# Patient Record
Sex: MALE | Race: WHITE | HISPANIC OR LATINO | Employment: FULL TIME | URBAN - METROPOLITAN AREA
[De-identification: names, ages, dates, MRNs, and addresses within clinical notes are randomized per-mention and may not be internally consistent; named-entity substitution may affect disease eponyms.]

---

## 2017-07-03 ENCOUNTER — HOSPITAL ENCOUNTER (OUTPATIENT)
Dept: LAB | Facility: MEDICAL CENTER | Age: 42
End: 2017-07-03
Attending: NURSE PRACTITIONER
Payer: COMMERCIAL

## 2017-07-03 ENCOUNTER — OFFICE VISIT (OUTPATIENT)
Dept: MEDICAL GROUP | Facility: MEDICAL CENTER | Age: 42
End: 2017-07-03
Payer: COMMERCIAL

## 2017-07-03 VITALS
DIASTOLIC BLOOD PRESSURE: 70 MMHG | WEIGHT: 213 LBS | HEART RATE: 69 BPM | HEIGHT: 70 IN | SYSTOLIC BLOOD PRESSURE: 126 MMHG | RESPIRATION RATE: 16 BRPM | OXYGEN SATURATION: 97 % | TEMPERATURE: 97.2 F | BODY MASS INDEX: 30.49 KG/M2

## 2017-07-03 DIAGNOSIS — M54.50 ACUTE BILATERAL LOW BACK PAIN WITHOUT SCIATICA: ICD-10-CM

## 2017-07-03 DIAGNOSIS — E66.9 OBESITY (BMI 30-39.9): ICD-10-CM

## 2017-07-03 DIAGNOSIS — R00.2 PALPITATIONS: ICD-10-CM

## 2017-07-03 LAB
ALBUMIN SERPL BCP-MCNC: 3.9 G/DL (ref 3.2–4.9)
ALBUMIN/GLOB SERPL: 1.4 G/DL
ALP SERPL-CCNC: 61 U/L (ref 30–99)
ALT SERPL-CCNC: 30 U/L (ref 2–50)
ANION GAP SERPL CALC-SCNC: 5 MMOL/L (ref 0–11.9)
AST SERPL-CCNC: 21 U/L (ref 12–45)
BILIRUB SERPL-MCNC: 0.6 MG/DL (ref 0.1–1.5)
BUN SERPL-MCNC: 14 MG/DL (ref 8–22)
CALCIUM SERPL-MCNC: 9.1 MG/DL (ref 8.5–10.5)
CHLORIDE SERPL-SCNC: 107 MMOL/L (ref 96–112)
CO2 SERPL-SCNC: 28 MMOL/L (ref 20–33)
CREAT SERPL-MCNC: 0.87 MG/DL (ref 0.5–1.4)
GFR SERPL CREATININE-BSD FRML MDRD: >60 ML/MIN/1.73 M 2
GLOBULIN SER CALC-MCNC: 2.8 G/DL (ref 1.9–3.5)
GLUCOSE SERPL-MCNC: 112 MG/DL (ref 65–99)
POTASSIUM SERPL-SCNC: 4.6 MMOL/L (ref 3.6–5.5)
PROT SERPL-MCNC: 6.7 G/DL (ref 6–8.2)
SODIUM SERPL-SCNC: 140 MMOL/L (ref 135–145)
TSH SERPL DL<=0.005 MIU/L-ACNC: 1.77 UIU/ML (ref 0.3–3.7)

## 2017-07-03 PROCEDURE — 93000 ELECTROCARDIOGRAM COMPLETE: CPT | Performed by: NURSE PRACTITIONER

## 2017-07-03 PROCEDURE — 80053 COMPREHEN METABOLIC PANEL: CPT

## 2017-07-03 PROCEDURE — 36415 COLL VENOUS BLD VENIPUNCTURE: CPT

## 2017-07-03 PROCEDURE — 99203 OFFICE O/P NEW LOW 30 MIN: CPT | Performed by: NURSE PRACTITIONER

## 2017-07-03 PROCEDURE — 84443 ASSAY THYROID STIM HORMONE: CPT

## 2017-07-03 ASSESSMENT — PATIENT HEALTH QUESTIONNAIRE - PHQ9: CLINICAL INTERPRETATION OF PHQ2 SCORE: 0

## 2017-07-03 ASSESSMENT — ENCOUNTER SYMPTOMS
PALPITATIONS: 1
BACK PAIN: 1

## 2017-07-03 NOTE — PROGRESS NOTES
"Subjective:      Nilo Grant is a 41 y.o. male who presents with Establish Care and Palpitations            Palpitations     Nilo Grant is here today to establish care and for palpitation concerns and back pain. Patient recently moved here from [place].        1. Palpitations  Patient states that for years has had some problems with palpitations and states he had some sort of workup in [place] but it was negative. Over the past 2-3 weeks he has noticed it more prominently. He describes it as a \"very strong\" type feeling that may last for just a few seconds to a minute. He thinks he is short of breath when it occurs also. It seems to happen more when he is angry but he states it happened the other day when he was just walking short distances well. He reports there is no associated dizziness, syncope, nausea or radiating pain. He denies regular alcohol usage or stimulant usage. He states a pack of cigarettes will last him 2 weeks.    2. Acute bilateral low back pain without sciatica  Patient also reports he has been having some pain in his lower back bilaterally for about a week. There was no history of trauma. The pain is mostly with turning or bending. He denies weakness or numbness of the extremities and denies bowel or bladder problems. Symptoms improved with walking. He denies history of back problems.        Social History   Substance Use Topics   • Smoking status: Current Every Day Smoker     Types: Cigarettes   • Smokeless tobacco: Never Used      Comment: 1 pack per 2 weeks   • Alcohol Use: Yes      Comment: occas     No current outpatient prescriptions on file.     No current facility-administered medications for this visit.     Family History   Problem Relation Age of Onset   • Breast Cancer Mother    • Diabetes Father    • Hyperlipidemia Father    • Heart Attack Father    History reviewed. No pertinent past medical history.    Review of Systems   Cardiovascular: Positive for palpitations. " "  Musculoskeletal: Positive for back pain.   All other systems reviewed and are negative.         Objective:     /70 mmHg  Pulse 69  Temp(Src) 36.2 °C (97.2 °F)  Resp 16  Ht 1.778 m (5' 10\")  Wt 96.616 kg (213 lb)  BMI 30.56 kg/m2  SpO2 97%     Physical Exam   Constitutional: He is oriented to person, place, and time. He appears well-developed and well-nourished. No distress.   HENT:   Head: Normocephalic and atraumatic.   Right Ear: External ear normal.   Left Ear: External ear normal.   Nose: Nose normal.   Mouth/Throat: Oropharynx is clear and moist.   Eyes: Conjunctivae are normal. Right eye exhibits no discharge. Left eye exhibits no discharge.   Neck: Normal range of motion. Neck supple. No tracheal deviation present. No thyromegaly present.   Cardiovascular: Normal rate, regular rhythm and normal heart sounds.    No murmur heard.  Pulmonary/Chest: Effort normal and breath sounds normal. No respiratory distress. He has no wheezes. He has no rales.   Musculoskeletal:        Lumbar back: He exhibits pain.   Lymphadenopathy:     He has no cervical adenopathy.   Neurological: He is alert and oriented to person, place, and time. Coordination normal.   5/5 strength of lower extremities bilaterally to flexion and extension.   Skin: Skin is warm and dry. No rash noted. He is not diaphoretic. No erythema.   Psychiatric: He has a normal mood and affect. His behavior is normal. Judgment and thought content normal.   Nursing note and vitals reviewed.              Assessment/Plan:     1. Palpitations  EKG in the office today shows normal sinus rhythm and there is no obvious cause for his symptoms except for possibly anxiety or anger. I will refer him to cardiology to see if a Holter monitor is warranted. I will check thyroid.  - COMP METABOLIC PANEL; Future  - TSH; Future  - [place] ELECTROCARDIOGRAM, COMPLETE  - REFERRAL TO CARDIOLOGY    2. Acute bilateral low back pain without sciatica  Appears to be " musculoskeletal and has only been present for one week. I recommended weight loss, increased exercise, and he or ice to the area. He may use Tylenol for pain. If it does not improve in 4 weeks I recommended physical therapy and imaging.    3. Obesity (BMI 30-39.9)    - Patient identified as having weight management issue.  Appropriate orders and counseling given.

## 2017-07-03 NOTE — MR AVS SNAPSHOT
"Nilo Grant   7/3/2017 8:00 AM   Office Visit   MRN: [id number]    Department:  [address] [place] [address]   Dept Phone:  [phone]    Description:  Male : 1975   Provider:  RANDY Perez           Reason for Visit     Establish Care general check up    Palpitations for the past 15 days/ come and goes       Allergies as of 7/3/2017     Not on File      You were diagnosed with     Palpitations   [785.1.ICD-9-CM]       Acute bilateral low back pain without sciatica   [3665069]       Obesity (BMI 30-39.9)   [019891]         Vital Signs     Blood Pressure Pulse Temperature Respirations Height Weight    126/70 mmHg 69 36.2 °C (97.2 °F) 16 1.778 m (5' 10\") 96.616 kg (213 lb)    Body Mass Index Oxygen Saturation Smoking Status             30.56 kg/m2 97% Current Every Day Smoker         Basic Information     Date Of Birth Sex Race Ethnicity Preferred Language    1975 Male White  Origin ([place]er,[place] Islander,S[place]an, etc) English      Problem List              ICD-10-CM Priority Class Noted - Resolved    Palpitations R00.2   7/3/2017 - Present    Obesity (BMI 30-39.9) E66.9   7/3/2017 - Present      Health Maintenance     Patient has no pending health maintenance at this time      Current Immunizations     No immunizations on file.      Below and/or attached are the medications your provider expects you to take. Review all of your home medications and newly ordered medications with your provider and/or pharmacist. Follow medication instructions as directed by your provider and/or pharmacist. Please keep your medication list with you and share with your provider. Update the information when medications are discontinued, doses are changed, or new medications (including over-the-counter products) are added; and carry medication information at all times in the event of emergency situations     Allergies:  No Known Allergies          Medications  Valid as of: 2017 -  8:16 AM   " Generic Name Brand Name Tablet Size Instructions for use    .                 Medicines prescribed today were sent to:     [facility] [address] [facility] ([place] [place] [facility] [address]    [address] [facility] [place]) [place] [address]    Phone: [phone] Fax: [phone]    Open 24 Hours?: No      Medication refill instructions:       If your prescription bottle indicates you have medication refills left, it is not necessary to call your provider’s office. Please contact your pharmacy and they will refill your medication.    If your prescription bottle indicates you do not have any refills left, you may request refills at any time through one of the following ways: The online [employer] system (except Urgent Care), by calling your provider’s office, or by asking your pharmacy to contact your provider’s office with a refill request. Medication refills are processed only during regular business hours and may not be available until the next business day. Your provider may request additional information or to have a follow-up visit with you prior to refilling your medication.   *Please Note: Medication refills are assigned a new Rx number when refilled electronically. Your pharmacy may indicate that no refills were authorized even though a new prescription for the same medication is available at the pharmacy. Please request the medicine by name with the pharmacy before contacting your provider for a refill.        Your To Do List     Future Labs/Procedures Complete By Expires    COMP METABOLIC PANEL  As directed 7/4/2018    TSH  As directed 7/4/2018      Referral     A referral request has been sent to our patient care coordination department. Please allow 3-5 business days for us to process this request and contact you either by phone or mail. If you do not hear from us by the 5th business day, please call us at [phone].           [employer] Status: Patient Declined        Quit Tobacco Information     Do you want to quit using  tobacco?    Quitting tobacco decreases risks of cancer, heart and lung disease, increases life expectancy, improves sense of taste and smell, and increases spending money, among other benefits.    If you are thinking about quitting, we can help.  • [facility] Quit Tobacco Program: 806.745.8950  o Program occurs weekly for four weeks and includes pharmacist consultation on products to support quitting smoking or chewing tobacco. A provider referral is needed for pharmacist consultation.  • Tobacco Users Help Hotline: 2-626-QUIT-NOW (055-8408) or https://[place].quitlogix.org/  o Free, confidential telephone and online coaching for [place] residents. Sessions are designed on a schedule that is convenient for you. Eligible clients receive free nicotine replacement therapy.  • Nationally: www.smokefree.gov  o Information and professional assistance to support both immediate and long-term needs as you become, and remain, a non-smoker. Smokefree.gov allows you to choose the help that best fits your needs.

## 2017-07-03 NOTE — Clinical Note
[facility]  RANDY Perez  [address]  [place] [address]  Fax: [phone]   Authorization for Release/Disclosure of   Protected Health Information   Name: NILO GRANT : 1975 SSN: [id number]   Address: [address] [place] [address] Phone:    [phone] (home)    I authorize the entity listed below to release/disclose the PHI below to:   [facility]/RANDY Perez and RANDY Perez   Provider or Entity Name:     Address   City, State, Zip   Phone:      Fax:     Reason for request: continuity of care   Information to be released:    [  ] LAST COLONOSCOPY,  including any PATH REPORT and follow-up  [  ] LAST FIT/COLOGUARD RESULT [  ] LAST DEXA  [  ] LAST MAMMOGRAM  [  ] LAST PAP  [  ] LAST LABS [  ] RETINA EXAM REPORT  [  ] IMMUNIZATION RECORDS  [  ] Release all info      [  ] Check here and initial the line next to each item to release ALL health information INCLUDING  _____ Care and treatment for drug and / or alcohol abuse  _____ HIV testing, infection status, or AIDS  _____ Genetic Testing    DATES OF SERVICE OR TIME PERIOD TO BE DISCLOSED: _____________  I understand and acknowledge that:  * This Authorization may be revoked at any time by you in writing, except if your health information has already been used or disclosed.  * Your health information that will be used or disclosed as a result of you signing this authorization could be re-disclosed by the recipient. If this occurs, your re-disclosed health information may no longer be protected by State or Federal laws.  * You may refuse to sign this Authorization. Your refusal will not affect your ability to obtain treatment.  * This Authorization becomes effective upon signing and will  on (date) __________.      If no date is indicated, this Authorization will  one (1) year from the signature date.    Name: Nilo Grant    Signature:   Date:     7/3/2017       PLEASE FAX  REQUESTED RECORDS BACK TO: [phone]

## 2017-08-04 ENCOUNTER — OFFICE VISIT (OUTPATIENT)
Dept: CARDIOLOGY | Facility: MEDICAL CENTER | Age: 42
End: 2017-08-04
Payer: COMMERCIAL

## 2017-08-04 VITALS
WEIGHT: 212 LBS | HEIGHT: 70 IN | SYSTOLIC BLOOD PRESSURE: 130 MMHG | OXYGEN SATURATION: 96 % | BODY MASS INDEX: 30.35 KG/M2 | HEART RATE: 62 BPM | DIASTOLIC BLOOD PRESSURE: 88 MMHG

## 2017-08-04 DIAGNOSIS — Z13.6 SCREENING FOR ISCHEMIC HEART DISEASE: ICD-10-CM

## 2017-08-04 DIAGNOSIS — R00.2 PALPITATIONS: ICD-10-CM

## 2017-08-04 DIAGNOSIS — R42 DIZZINESS: ICD-10-CM

## 2017-08-04 DIAGNOSIS — E66.9 OBESITY (BMI 30-39.9): ICD-10-CM

## 2017-08-04 DIAGNOSIS — R73.9 HYPERGLYCEMIA: ICD-10-CM

## 2017-08-04 DIAGNOSIS — R40.0 DAYTIME SLEEPINESS: ICD-10-CM

## 2017-08-04 PROCEDURE — 99204 OFFICE O/P NEW MOD 45 MIN: CPT | Performed by: INTERNAL MEDICINE

## 2017-08-04 PROCEDURE — 93000 ELECTROCARDIOGRAM COMPLETE: CPT | Performed by: INTERNAL MEDICINE

## 2017-08-04 NOTE — Clinical Note
[facility]   [address]   [address]  [place] [address]  Phone: [phone]  Fax: [phone]              Nilo Arciniega  1975    Encounter Date: 8/4/2017    RANDY Perez    Thank you for the referral. I had the pleasure of seeing Nilo Arciniega today in cardiology clinic. I've attached my visit note below. If you have any questions please feel free to give me a call anytime.      Sarkis Saini MD, PhD, [facility]  Cardiology and Lipidology  [facility]                                                                  PROGRESS NOTE:  Cardiology Consult Note:    Sarkis Saini  Date & Time note created:    8/4/2017   8:04 AM       Patient ID:  Name:             Nilo Liu   YOB: 1975  Age:                 42 y.o.  male   MRN:               [id number]                                                             Chief Complaint:   Chief Complaint   Patient presents with   • Palpitations   • Dizziness   • Numbness             History of Present Illness:   Nilo Arciniega has recent increased palpitation with dizziness x 2 wks; Started palpitation 23 yrs ago when his Mother past; but did not have dizziness; Recent daytime sleepiness and fatigue; Increased social/family stress; Occ EtOH and daily Coffee; More recent episodes of wake up gapping air at sleep    Review of Systems:  See above   Constitutional: Denies fevers, Denies weight changes  Eyes: Denies changes in vision, no eye pain  Ears/Nose/Throat/Mouth: Denies nasal congestion or sore throat   Cardiovascular: Denies chest pain palpitations   Respiratory: Denies shortness of breath , Denies cough  Gastrointestinal/Hepatic: Denies abdominal pain, nausea, vomiting, diarrhea, constipation or GI bleeding   Genitourinary: Denies bladder dysfunction, dysuria or  "frequency  Musculoskeletal/Rheum: Denies  joint pain and swelling   Skin/Breast: Denies rash, denies breast lumps or discharge  Neurological: Denies headache, confusion, memory loss or focal weakness/parasthesias  Psychiatric: denies mood disorder   Endocrine: denies hx of diabetes or thyroid dysfunction  Heme/Oncology/Lymph Nodes: Denies enlarged lymph nodes, denies bruising or known bleeding disorder  Allergic/Immunologic: Denies hx of allergies      All other systems were reviewed and are negative (AMA/CMS criteria)    Past Medical History:   No past medical history on file.      Past Surgical History:  Past Surgical History   Procedure Laterality Date   • Appendectomy         Hospital Medications:  No current outpatient prescriptions on file.    Current Outpatient Medications:  No current outpatient prescriptions on file.     No current facility-administered medications for this visit.         Medication Allergy/Sensitivities:  No Known Allergies    Family History:    Family History   Problem Relation Age of Onset   • Breast Cancer Mother    • Diabetes Father    • Hyperlipidemia Father    • Heart Attack Father        Social History:  Social History     Social History   • Marital Status:      Spouse Name: N/A   • Number of Children: N/A   • Years of Education: N/A     Occupational History   • Not on file.     Social History Main Topics   • Smoking status: Current Every Day Smoker     Types: Cigarettes   • Smokeless tobacco: Never Used      Comment: 1 pack per 2 weeks   • Alcohol Use: Yes      Comment: occas   • Drug Use: No   • Sexual Activity:     Partners: Female     Other Topics Concern   • Not on file     Social History Narrative   • No narrative on file         Physical Exam:  Vitals  Weight/BMI: Body mass index is 30.42 kg/(m^2).  Blood pressure 130/88, pulse 62, height 1.778 m (5' 10\"), weight 96.163 kg (212 lb), SpO2 96 %.  Filed Vitals:    08/04/17 0743   BP: 130/88   Pulse: 62   Height: 1.778 m " "(5' 10\")   Weight: 96.163 kg (212 lb)   SpO2: 96%     Oxygen Therapy:  Pulse Oximetry: 96 %  General Appearance:   Well developed, Well nourished, No acute distress, Non-toxic appearance.   HENT:  Normocephalic, Atraumatic, Oropharynx moist mucous membranes, Dentition: Mallampati 4 OP, Nose normal.    Eyes:  PERRLA, EOMI, Conjunctiva normal, No discharge.  Neck:  Normal range of motion, No cervical tenderness, Supple, No stridor, no JVD .  No thyromegaly.  No carotid bruit.  Cardiovascular:  Normal heart rate, Normal rhythm,  S1, S2, no S3,  S4; No gallops; No murmurs, No rubs, .   Extremitites with intact distal pulses, no cyanosis, clubbing or edema.  No heaves, thrills, HJR;  Peripheral pulses: carotid 2+, brachial 2+, radial 2+, ulnar 2+, femoral 2+, popliteal 2+, PT 2+, DP 2+;  Lungs:  Respiratory effort is normal. Normal breath sounds, breath sounds clear to auscultation bilaterally,  no rales, no rhonchi, no wheezing.   Abdomen: Bowel sounds normal, Soft, No tenderness, No guarding, No rebound, No masses, No hepatosplenomegaly.  Skin: Warm, Dry, No erythema, No rash, no induration or crepitus.  Neurologic: Alert & oriented x 3, Normal motor function, Normal sensory function, No focal deficits noted, cranial nerves II through XII are normal,  normal gait.  Psychiatric: Affect normal, Judgment normal, Mood normal.      Data Review:     Records reviewed and summarized in current documentation    Lab Data Review:  Lab Results   Component Value Date/Time    SODIUM 140 07/03/2017 08:50 AM    POTASSIUM 4.6 07/03/2017 08:50 AM    CHLORIDE 107 07/03/2017 08:50 AM    CO2 28 07/03/2017 08:50 AM    GLUCOSE 112* 07/03/2017 08:50 AM    BUN 14 07/03/2017 08:50 AM    CREATININE 0.87 07/03/2017 08:50 AM      No results found for: PROTHROMBTM, INR   No results found for: WBC, RBC, HEMOGLOBIN, HEMATOCRIT, MCV, MCH, MCHC, MPV, NEUTSPOLYS, LYMPHOCYTES, MONOCYTES, EOSINOPHILS, BASOPHILS, HYPOCHROMIA, ANISOCYTOSIS "     Imaging/Procedures Review:    To my review shows:na    EKG To my review shows: SR     Assessment and Plan.   42 y.o. male has symptomatic palpitation; Advise off Caffeine and EtOH for now; Pls see orders for eval; Discussed with patient the OPO ordered, may order O2 supplement and/or  sleep evaluation if OPO findings are abnormal; the patient will be contacted in the future regarding my advisement.      1. Dizziness    - ECHOCARDIOGRAM COMP W/O CONT; Future    2. Palpitations    - ECHOCARDIOGRAM COMP W/O CONT; Future  - HOLTER MONITOR STUDY; Future  - CBC WITHOUT DIFFERENTIAL  - COMP METABOLIC PANEL; Future  - TSH; Future    3. Screening for ischemic heart disease    - LIPID PANEL  - CBC WITHOUT DIFFERENTIAL  - COMP METABOLIC PANEL; Future    4. Hyperglycemia    - HEMOGLOBIN A1C    5. Obesity (BMI 30-39.9)  Up and down      Return to clinic in  2 , months  1. Dizziness  ECHOCARDIOGRAM COMP W/O CONT   2. Palpitations  ECHOCARDIOGRAM COMP W/O CONT    HOLTER MONITOR STUDY    CBC WITHOUT DIFFERENTIAL    COMP METABOLIC PANEL    TSH   3. Screening for ischemic heart disease  LIPID PANEL    CBC WITHOUT DIFFERENTIAL    COMP METABOLIC PANEL   4. Hyperglycemia  HEMOGLOBIN A1C   5. Obesity (BMI 30-39.9)           Jorge Lugo, A.P.N.  [address]  [address]  [place] [address]  VIA In Basket

## 2017-08-04 NOTE — PROGRESS NOTES
Cardiology Consult Note:    Sarkis Saini  Date & Time note created:    8/4/2017   8:04 AM       Patient ID:  Name:             Nilo Liu   YOB: 1975  Age:                 42 y.o.  male   MRN:               [id number]                                                             Chief Complaint:   Chief Complaint   Patient presents with   • Palpitations   • Dizziness   • Numbness             History of Present Illness:   Nilo Arciniega has recent increased palpitation with dizziness x 2 wks; Started palpitation 23 yrs ago when his Mother past; but did not have dizziness; Recent daytime sleepiness and fatigue; Increased social/family stress; Occ EtOH and daily Coffee; More recent episodes of wake up gapping air at sleep    Review of Systems:  See above   Constitutional: Denies fevers, Denies weight changes  Eyes: Denies changes in vision, no eye pain  Ears/Nose/Throat/Mouth: Denies nasal congestion or sore throat   Cardiovascular: Denies chest pain palpitations   Respiratory: Denies shortness of breath , Denies cough  Gastrointestinal/Hepatic: Denies abdominal pain, nausea, vomiting, diarrhea, constipation or GI bleeding   Genitourinary: Denies bladder dysfunction, dysuria or frequency  Musculoskeletal/Rheum: Denies  joint pain and swelling   Skin/Breast: Denies rash, denies breast lumps or discharge  Neurological: Denies headache, confusion, memory loss or focal weakness/parasthesias  Psychiatric: denies mood disorder   Endocrine: denies hx of diabetes or thyroid dysfunction  Heme/Oncology/Lymph Nodes: Denies enlarged lymph nodes, denies bruising or known bleeding disorder  Allergic/Immunologic: Denies hx of allergies      All other systems were reviewed and are negative (AMA/CMS criteria)    Past Medical History:   No past medical history on file.      Past Surgical History:  Past Surgical History   Procedure Laterality Date   • Appendectomy         Hospital  "Medications:  No current outpatient prescriptions on file.    Current Outpatient Medications:  No current outpatient prescriptions on file.     No current facility-administered medications for this visit.         Medication Allergy/Sensitivities:  No Known Allergies    Family History:    Family History   Problem Relation Age of Onset   • Breast Cancer Mother    • Diabetes Father    • Hyperlipidemia Father    • Heart Attack Father        Social History:  Social History     Social History   • Marital Status:      Spouse Name: N/A   • Number of Children: N/A   • Years of Education: N/A     Occupational History   • Not on file.     Social History Main Topics   • Smoking status: Current Every Day Smoker     Types: Cigarettes   • Smokeless tobacco: Never Used      Comment: 1 pack per 2 weeks   • Alcohol Use: Yes      Comment: occas   • Drug Use: No   • Sexual Activity:     Partners: Female     Other Topics Concern   • Not on file     Social History Narrative   • No narrative on file         Physical Exam:  Vitals  Weight/BMI: Body mass index is 30.42 kg/(m^2).  Blood pressure 130/88, pulse 62, height 1.778 m (5' 10\"), weight 96.163 kg (212 lb), SpO2 96 %.  Filed Vitals:    08/04/17 0743   BP: 130/88   Pulse: 62   Height: 1.778 m (5' 10\")   Weight: 96.163 kg (212 lb)   SpO2: 96%     Oxygen Therapy:  Pulse Oximetry: 96 %  General Appearance:   Well developed, Well nourished, No acute distress, Non-toxic appearance.   HENT:  Normocephalic, Atraumatic, Oropharynx moist mucous membranes, Dentition: Mallampati 4 OP, Nose normal.    Eyes:  PERRLA, EOMI, Conjunctiva normal, No discharge.  Neck:  Normal range of motion, No cervical tenderness, Supple, No stridor, no JVD .  No thyromegaly.  No carotid bruit.  Cardiovascular:  Normal heart rate, Normal rhythm,  S1, S2, no S3,  S4; No gallops; No murmurs, No rubs, .   Extremitites with intact distal pulses, no cyanosis, clubbing or edema.  No heaves, thrills, HJR;  Peripheral " pulses: carotid 2+, brachial 2+, radial 2+, ulnar 2+, femoral 2+, popliteal 2+, PT 2+, DP 2+;  Lungs:  Respiratory effort is normal. Normal breath sounds, breath sounds clear to auscultation bilaterally,  no rales, no rhonchi, no wheezing.   Abdomen: Bowel sounds normal, Soft, No tenderness, No guarding, No rebound, No masses, No hepatosplenomegaly.  Skin: Warm, Dry, No erythema, No rash, no induration or crepitus.  Neurologic: Alert & oriented x 3, Normal motor function, Normal sensory function, No focal deficits noted, cranial nerves II through XII are normal,  normal gait.  Psychiatric: Affect normal, Judgment normal, Mood normal.      Data Review:     Records reviewed and summarized in current documentation    Lab Data Review:  Lab Results   Component Value Date/Time    SODIUM 140 07/03/2017 08:50 AM    POTASSIUM 4.6 07/03/2017 08:50 AM    CHLORIDE 107 07/03/2017 08:50 AM    CO2 28 07/03/2017 08:50 AM    GLUCOSE 112* 07/03/2017 08:50 AM    BUN 14 07/03/2017 08:50 AM    CREATININE 0.87 07/03/2017 08:50 AM      No results found for: PROTHROMBTM, INR   No results found for: WBC, RBC, HEMOGLOBIN, HEMATOCRIT, MCV, MCH, MCHC, MPV, NEUTSPOLYS, LYMPHOCYTES, MONOCYTES, EOSINOPHILS, BASOPHILS, HYPOCHROMIA, ANISOCYTOSIS     Imaging/Procedures Review:    To my review shows:na    EKG To my review shows: SR     Assessment and Plan.   42 y.o. male has symptomatic palpitation; Advise off Caffeine and EtOH for now; Pls see orders for eval; Discussed with patient the OPO ordered, may order O2 supplement and/or  sleep evaluation if OPO findings are abnormal; the patient will be contacted in the future regarding my advisement.      1. Dizziness    - ECHOCARDIOGRAM COMP W/O CONT; Future    2. Palpitations    - ECHOCARDIOGRAM COMP W/O CONT; Future  - HOLTER MONITOR STUDY; Future  - CBC WITHOUT DIFFERENTIAL  - COMP METABOLIC PANEL; Future  - TSH; Future    3. Screening for ischemic heart disease    - LIPID PANEL  - CBC WITHOUT  DIFFERENTIAL  - COMP METABOLIC PANEL; Future    4. Hyperglycemia    - HEMOGLOBIN A1C    5. Obesity (BMI 30-39.9)  Up and down      Return to clinic in  2 , months  1. Dizziness  ECHOCARDIOGRAM COMP W/O CONT   2. Palpitations  ECHOCARDIOGRAM COMP W/O CONT    HOLTER MONITOR STUDY    CBC WITHOUT DIFFERENTIAL    COMP METABOLIC PANEL    TSH   3. Screening for ischemic heart disease  LIPID PANEL    CBC WITHOUT DIFFERENTIAL    COMP METABOLIC PANEL   4. Hyperglycemia  HEMOGLOBIN A1C   5. Obesity (BMI 30-39.9)

## 2017-08-04 NOTE — MR AVS SNAPSHOT
"        Nilo Arciniega   2017 7:45 AM   Office Visit   MRN: [id number]    Department:  Heart [facility]   Dept Phone:  [phone]    Description:  Male : 1975   Provider:  Sarkis Saini MD,[facility]           Reason for Visit     Palpitations     Dizziness     Numbness           Allergies as of 2017     No Known Allergies      You were diagnosed with     Dizziness   [836204]       Palpitations   [785.1.ICD-9-CM]       Screening for ischemic heart disease   [V81.0.ICD-9-CM]       Hyperglycemia   [763358]       Obesity (BMI 30-39.9)   [590479]       Daytime sleepiness   [3963136]         Vital Signs     Blood Pressure Pulse Height Weight Body Mass Index Oxygen Saturation    130/88 mmHg 62 1.778 m (5' 10\") 96.163 kg (212 lb) 30.42 kg/m2 96%    Smoking Status                   Current Every Day Smoker           Basic Information     Date Of Birth Sex Race Ethnicity Preferred Language    1975 Male White  Origin (Serbian,Zimbabwean,Faroese,Logan, etc) Serbian      Your appointments     Aug 08, 2017  9:45 AM   ECHO with [facility]   ECHOCARDIOLOGY [facility])    [address]  [place] [address]   [phone]            Oct 04, 2017  7:45 AM   FOLLOW UP with Sarkis Saini MD,[facility]    [address]  [address]  [place] [id number] [phone]              Problem List              ICD-10-CM Priority Class Noted - Resolved    Palpitations R00.2   7/3/2017 - Present    Obesity (BMI 30-39.9) E66.9   7/3/2017 - Present      Health Maintenance        Date Due Completion Dates    IMM DTaP/Tdap/Td Vaccine (1 - Tdap) 1996 ---    IMM INFLUENZA (1) 2017 ---            Current Immunizations     No immunizations on file.      Below and/or attached are the medications your provider expects you to take. Review all of your home medications and newly ordered medications with your provider " and/or pharmacist. Follow medication instructions as directed by your provider and/or pharmacist. Please keep your medication list with you and share with your provider. Update the information when medications are discontinued, doses are changed, or new medications (including over-the-counter products) are added; and carry medication information at all times in the event of emergency situations     Allergies:  No Known Allergies          Medications  Valid as of: August 04, 2017 -  8:17 AM    Generic Name Brand Name Tablet Size Instructions for use    .                 Medicines prescribed today were sent to:     [address] (), [place] [address]    [address] () [place] [address]    Phone: [phone] Fax: [phone]    Open 24 Hours?: No      Medication refill instructions:       If your prescription bottle indicates you have medication refills left, it is not necessary to call your provider’s office. Please contact your pharmacy and they will refill your medication.    If your prescription bottle indicates you do not have any refills left, you may request refills at any time through one of the following ways: The online [facility] system (except Urgent Care), by calling your provider’s office, or by asking your pharmacy to contact your provider’s office with a refill request. Medication refills are processed only during regular business hours and may not be available until the next business day. Your provider may request additional information or to have a follow-up visit with you prior to refilling your medication.   *Please Note: Medication refills are assigned a new Rx number when refilled electronically. Your pharmacy may indicate that no refills were authorized even though a new prescription for the same medication is available at the pharmacy. Please request the medicine by name with the pharmacy before contacting your provider for a refill.        Your To Do List      Future Labs/Procedures Complete By Expires    COMP METABOLIC PANEL  As directed 8/4/2018    ECHOCARDIOGRAM COMP W/O CONT  As directed 8/4/2018    HOLTER MONITOR STUDY  As directed 8/4/2018    TSH  As directed 8/4/2018         VeriTainer Access Code: [email or web address]  Expires: 8/5/2017  9:08 AM    DIYt  A secure, online tool to manage your health information     [employer] VeriTainer® is a secure, online tool that connects you to your personalized health information from the privacy of your home -- day or night - making it very easy for you to manage your healthcare. Once the activation process is completed, you can even access your medical information using the VeriTainer ariel, which is available for free in the Apple Ariel store or Google Play store.     VeriTainer provides the following levels of access (as shown below):   My Chart Features   [facility]  Specialists [facility]  Urgent  Care Non-[facility]  Primary Care  Doctor   Email your healthcare team securely and privately 24/7 X X X    Manage appointments: schedule your next appointment; view details of past/upcoming appointments X      Request prescription refills. X      View recent personal medical records, including lab and immunizations X X X X   View health record, including health history, allergies, medications X X X X   Read reports about your outpatient visits, procedures, consult and ER notes X X X X   See your discharge summary, which is a recap of your hospital and/or ER visit that includes your diagnosis, lab results, and care plan. X X       How to register for VeriTainer:  1. Go to  [email or web address].  2. Click on the Sign Up Now box, which takes you to the New Member Sign Up page. You will need to provide the following information:  a. Enter your VeriTainer Access Code exactly as it appears at the top of this page. (You will not need to use this code after you’ve completed the sign-up process. If you do not sign up before the  expiration date, you must request a new code.)   b. Enter your date of birth.   c. Enter your home email address.   d. Click Submit, and follow the next screen’s instructions.  3. Create a [employer] ID. This will be your [employer] login ID and cannot be changed, so think of one that is secure and easy to remember.  4. Create a [facility] [employer] password. You can change your password at any time.  5. Enter your Password Reset Question and Answer. This can be used at a later time if you forget your password.   6. Enter your e-mail address. This allows you to receive e-mail notifications when new information is available in [employer].  7. Click Sign Up. You can now view your health information.    For assistance activating your [employer] account, call [phone]        Quit Tobacco Information     Do you want to quit using tobacco?    Quitting tobacco decreases risks of cancer, heart and lung disease, increases life expectancy, improves sense of taste and smell, and increases spending money, among other benefits.    If you are thinking about quitting, we can help.  • [facility] Quit Tobacco Program: [phone]  o Program occurs weekly for four weeks and includes pharmacist consultation on products to support quitting smoking or chewing tobacco. A provider referral is needed for pharmacist consultation.  • Tobacco Users Help Hotline: 3-252-QUIT-NOW (243-7941) or https://[place].quitlogix.org/  o Free, confidential telephone and online coaching for [place] residents. Sessions are designed on a schedule that is convenient for you. Eligible clients receive free nicotine replacement therapy.  • Nationally: www.smokefree.gov  o Information and professional assistance to support both immediate and long-term needs as you become, and remain, a non-smoker. Smokefree.gov allows you to choose the help that best fits your needs.

## 2017-08-08 ENCOUNTER — HOSPITAL ENCOUNTER (OUTPATIENT)
Dept: CARDIOLOGY | Facility: MEDICAL CENTER | Age: 42
End: 2017-08-08
Attending: INTERNAL MEDICINE
Payer: COMMERCIAL

## 2017-08-08 DIAGNOSIS — R00.2 PALPITATIONS: ICD-10-CM

## 2017-08-08 DIAGNOSIS — R42 DIZZINESS: ICD-10-CM

## 2017-08-08 LAB
LV EJECT FRACT  99904: 55
LV EJECT FRACT MOD 2C 99903: 70.02
LV EJECT FRACT MOD 4C 99902: 49.9
LV EJECT FRACT MOD BP 99901: 61.57

## 2017-08-08 PROCEDURE — 93306 TTE W/DOPPLER COMPLETE: CPT

## 2017-08-08 PROCEDURE — 93306 TTE W/DOPPLER COMPLETE: CPT | Mod: 26 | Performed by: INTERNAL MEDICINE

## 2017-08-10 ENCOUNTER — TELEPHONE (OUTPATIENT)
Dept: CARDIOLOGY | Facility: MEDICAL CENTER | Age: 42
End: 2017-08-10

## 2017-08-23 ENCOUNTER — NON-PROVIDER VISIT (OUTPATIENT)
Dept: CARDIOLOGY | Facility: MEDICAL CENTER | Age: 42
End: 2017-08-23
Payer: COMMERCIAL

## 2017-08-23 DIAGNOSIS — R00.1 SINUS BRADYCARDIA: ICD-10-CM

## 2017-08-23 DIAGNOSIS — R00.0 SINUS TACHYCARDIA: ICD-10-CM

## 2017-08-23 DIAGNOSIS — R00.2 PALPITATIONS: ICD-10-CM

## 2017-08-29 DIAGNOSIS — R00.2 PALPITATIONS: ICD-10-CM

## 2017-08-29 LAB — EKG IMPRESSION: NORMAL

## 2017-08-29 PROCEDURE — 93224 XTRNL ECG REC UP TO 48 HRS: CPT | Performed by: INTERNAL MEDICINE

## 2017-09-01 ENCOUNTER — OFFICE VISIT (OUTPATIENT)
Dept: URGENT CARE | Facility: CLINIC | Age: 42
End: 2017-09-01

## 2017-09-01 DIAGNOSIS — Z02.1 PHYSICAL EXAM, PRE-EMPLOYMENT: Primary | ICD-10-CM

## 2017-09-01 DIAGNOSIS — Z02.83 EMPLOYMENT-RELATED DRUG TESTING, ENCOUNTER FOR: ICD-10-CM

## 2017-09-01 PROCEDURE — 8907 PR URINE COLLECT ONLY: Performed by: PHYSICIAN ASSISTANT

## 2017-09-01 PROCEDURE — 8915 PR COMPREHENSIVE PHYSICAL: Performed by: PHYSICIAN ASSISTANT

## 2017-09-01 NOTE — PROGRESS NOTES
Subjective:      Pt is a 42 y.o. male who presents with need for physical exam          HPI  Pt is here today for a pre-employment physical. Pt denies taking any medication. Pt states they have been in good health with no infections or illnesses lately. PT denies significant PMH and PSH. Pt denies CP, SOB, NVD, paresthesias, headaches, dizziness, change in vision, hives, or joint pain. Pt states current pain is 0/10. The pt's medication list, problem list, and allergies have been evaluated and reviewed during today's visit.    PMH:  Negative per pt.      PSH:  Past Surgical History:   Procedure Laterality Date   • APPENDECTOMY         Fam Hx:    family history includes Breast Cancer in his mother; Diabetes in his father; Heart Attack in his father; Hyperlipidemia in his father.  Family Status   Relation Status   • Mother    • Father        Soc HX:  Social History     Social History   • Marital status:      Spouse name: N/A   • Number of children: N/A   • Years of education: N/A     Occupational History   • Not on file.     Social History Main Topics   • Smoking status: Current Every Day Smoker     Types: Cigarettes   • Smokeless tobacco: Never Used      Comment: 1 pack per 2 weeks   • Alcohol use Yes      Comment: occas   • Drug use: No   • Sexual activity: Yes     Partners: Female     Other Topics Concern   • Not on file     Social History Narrative   • No narrative on file         Medications:  No current outpatient prescriptions on file.      Allergies:  Review of patient's allergies indicates no known allergies.    ROS  Constitutional: Negative for fever, chills and malaise/fatigue.   HENT: Negative for congestion and sore throat.    Eyes: Negative for blurred vision, double vision and photophobia.   Respiratory: Negative for cough and shortness of breath.    Cardiovascular: Negative for chest pain and palpitations.   Gastrointestinal: Negative for heartburn, nausea, vomiting, abdominal  pain, diarrhea and constipation.   Genitourinary: Negative for dysuria and flank pain.   Musculoskeletal: Negative for joint pain and myalgias.   Skin: Negative for itching and rash.   Neurological: Negative for dizziness, tingling and headaches.   Endo/Heme/Allergies: Does not bruise/bleed easily.   Psychiatric/Behavioral: Negative for depression. The patient is not nervous/anxious.           Objective:     VSS, AF     Physical Exam      Constitutional: PT is oriented to person, place, and time. PT appears well-developed and well-nourished. No distress.   HENT:   Head: Normocephalic and atraumatic.   Mouth/Throat: Oropharynx is clear and moist. No oropharyngeal exudate.   Eyes: Conjunctivae normal and EOM are normal. Pupils are equal, round, and reactive to light.   Neck: Normal range of motion. Neck supple. No thyromegaly present.   Cardiovascular: Normal rate, regular rhythm, normal heart sounds and intact distal pulses.  Exam reveals no gallop and no friction rub.    No murmur heard.  Pulmonary/Chest: Effort normal and breath sounds normal. No respiratory distress. PT has no wheezes. PT has no rales. Pt exhibits no tenderness.   Abdominal: Soft. Bowel sounds are normal. PT exhibits no distension and no mass. There is no tenderness. There is no rebound and no guarding.   Musculoskeletal: Normal range of motion. PT exhibits no edema and no tenderness.   Neurological: PT is alert and oriented to person, place, and time. PT has normal reflexes. No cranial nerve deficit.   Skin: Skin is warm and dry. No rash noted. PT is not diaphoretic. No erythema.       Psychiatric: PT has a normal mood and affect. PT behavior is normal. Judgment and thought content normal.          Assessment/Plan:     1. Physical exam, pre-employment    Rest, fluids encouraged.  AVS with medical info given.  Pt was in full understanding and agreement with the plan.

## 2017-09-01 NOTE — PATIENT INSTRUCTIONS

## 2017-09-29 ENCOUNTER — TELEPHONE (OUTPATIENT)
Dept: CARDIOLOGY | Facility: CLINIC | Age: 42
End: 2017-09-29

## 2017-10-03 ENCOUNTER — HOSPITAL ENCOUNTER (OUTPATIENT)
Dept: LAB | Facility: MEDICAL CENTER | Age: 42
End: 2017-10-03
Attending: INTERNAL MEDICINE
Payer: COMMERCIAL

## 2017-10-03 DIAGNOSIS — Z13.6 SCREENING FOR ISCHEMIC HEART DISEASE: ICD-10-CM

## 2017-10-03 DIAGNOSIS — R00.2 PALPITATIONS: ICD-10-CM

## 2017-10-03 LAB
ALBUMIN SERPL BCP-MCNC: 4.3 G/DL (ref 3.2–4.9)
ALBUMIN/GLOB SERPL: 1.7 G/DL
ALP SERPL-CCNC: 64 U/L (ref 30–99)
ALT SERPL-CCNC: 19 U/L (ref 2–50)
ANION GAP SERPL CALC-SCNC: 6 MMOL/L (ref 0–11.9)
AST SERPL-CCNC: 16 U/L (ref 12–45)
BILIRUB SERPL-MCNC: 0.4 MG/DL (ref 0.1–1.5)
BUN SERPL-MCNC: 15 MG/DL (ref 8–22)
CALCIUM SERPL-MCNC: 9.2 MG/DL (ref 8.5–10.5)
CHLORIDE SERPL-SCNC: 105 MMOL/L (ref 96–112)
CHOLEST SERPL-MCNC: 191 MG/DL (ref 100–199)
CO2 SERPL-SCNC: 27 MMOL/L (ref 20–33)
CREAT SERPL-MCNC: 0.87 MG/DL (ref 0.5–1.4)
ERYTHROCYTE [DISTWIDTH] IN BLOOD BY AUTOMATED COUNT: 42.6 FL (ref 35.9–50)
EST. AVERAGE GLUCOSE BLD GHB EST-MCNC: 117 MG/DL
GFR SERPL CREATININE-BSD FRML MDRD: >60 ML/MIN/1.73 M 2
GLOBULIN SER CALC-MCNC: 2.6 G/DL (ref 1.9–3.5)
GLUCOSE SERPL-MCNC: 105 MG/DL (ref 65–99)
HBA1C MFR BLD: 5.7 % (ref 0–5.6)
HCT VFR BLD AUTO: 44.5 % (ref 42–52)
HDLC SERPL-MCNC: 53 MG/DL
HGB BLD-MCNC: 15.6 G/DL (ref 14–18)
LDLC SERPL CALC-MCNC: 105 MG/DL
MCH RBC QN AUTO: 33 PG (ref 27–33)
MCHC RBC AUTO-ENTMCNC: 35.1 G/DL (ref 33.7–35.3)
MCV RBC AUTO: 94.1 FL (ref 81.4–97.8)
PLATELET # BLD AUTO: 201 K/UL (ref 164–446)
PMV BLD AUTO: 10.8 FL (ref 9–12.9)
POTASSIUM SERPL-SCNC: 4.2 MMOL/L (ref 3.6–5.5)
PROT SERPL-MCNC: 6.9 G/DL (ref 6–8.2)
RBC # BLD AUTO: 4.73 M/UL (ref 4.7–6.1)
SODIUM SERPL-SCNC: 138 MMOL/L (ref 135–145)
TRIGL SERPL-MCNC: 167 MG/DL (ref 0–149)
TSH SERPL DL<=0.005 MIU/L-ACNC: 2.16 UIU/ML (ref 0.3–3.7)
WBC # BLD AUTO: 5.1 K/UL (ref 4.8–10.8)

## 2017-10-03 PROCEDURE — 80061 LIPID PANEL: CPT

## 2017-10-03 PROCEDURE — 85027 COMPLETE CBC AUTOMATED: CPT

## 2017-10-03 PROCEDURE — 84443 ASSAY THYROID STIM HORMONE: CPT

## 2017-10-03 PROCEDURE — 83036 HEMOGLOBIN GLYCOSYLATED A1C: CPT

## 2017-10-03 PROCEDURE — 80053 COMPREHEN METABOLIC PANEL: CPT

## 2017-10-03 PROCEDURE — 36415 COLL VENOUS BLD VENIPUNCTURE: CPT

## 2017-10-04 ENCOUNTER — OFFICE VISIT (OUTPATIENT)
Dept: CARDIOLOGY | Facility: MEDICAL CENTER | Age: 42
End: 2017-10-04
Payer: COMMERCIAL

## 2017-10-04 VITALS
SYSTOLIC BLOOD PRESSURE: 106 MMHG | HEART RATE: 54 BPM | HEIGHT: 70 IN | BODY MASS INDEX: 29.35 KG/M2 | OXYGEN SATURATION: 95 % | DIASTOLIC BLOOD PRESSURE: 76 MMHG | WEIGHT: 205 LBS

## 2017-10-04 DIAGNOSIS — E88.810 METABOLIC SYNDROME X: ICD-10-CM

## 2017-10-04 DIAGNOSIS — E66.9 OBESITY (BMI 30-39.9): ICD-10-CM

## 2017-10-04 DIAGNOSIS — F17.200 CURRENT SMOKER: ICD-10-CM

## 2017-10-04 DIAGNOSIS — R00.2 PALPITATIONS: ICD-10-CM

## 2017-10-04 DIAGNOSIS — R73.9 HYPERGLYCEMIA: ICD-10-CM

## 2017-10-04 DIAGNOSIS — R42 DIZZINESS: ICD-10-CM

## 2017-10-04 PROCEDURE — 99214 OFFICE O/P EST MOD 30 MIN: CPT | Performed by: INTERNAL MEDICINE

## 2017-10-04 NOTE — PROGRESS NOTES
Chief Complaint   Patient presents with   • Follow-Up     Palpitation    This patient is an established male who is here today to discuss:  One episode of palpitation last week; But baseline bradycardia    Patient Active Problem List    Diagnosis Date Noted   • Palpitations 07/03/2017   • Obesity (BMI 30-39.9) 07/03/2017       No past medical history on file.  Past Surgical History:   Procedure Laterality Date   • APPENDECTOMY       Social History     Social History   • Marital status:      Spouse name: N/A   • Number of children: N/A   • Years of education: N/A     Social History Main Topics   • Smoking status: Current Every Day Smoker     Types: Cigarettes   • Smokeless tobacco: Never Used      Comment: 1 pack per 2 weeks   • Alcohol use Yes      Comment: occas   • Drug use: No   • Sexual activity: Yes     Partners: Female     Other Topics Concern   • Not on file     Social History Narrative   • No narrative on file     Family History   Problem Relation Age of Onset   • Breast Cancer Mother    • Diabetes Father    • Hyperlipidemia Father    • Heart Attack Father        No current outpatient prescriptions on file.     No current facility-administered medications for this visit.      Review of patient's allergies indicates no known allergies.    Review of Systems:     Constitutional: Denies fevers, Denies weight changes  Eyes: Denies changes in vision, no eye pain  Ears/Nose/Throat/Mouth: Denies nasal congestion or sore throat   Cardiovascular: Denies chest pain but episodic  palpitations   Respiratory: Denies shortness of breath , Denies cough  Gastrointestinal/Hepatic: Denies abdominal pain, nausea, vomiting, diarrhea, constipation or GI bleeding   Genitourinary: Denies bladder dysfunction, dysuria or frequency  Musculoskeletal/Rheum: Denies  joint pain and swelling   Skin/Breast: Denies rash, denies breast lumps or discharge  Neurological: Denies headache, confusion, memory loss or focal  "weakness/parasthesias  Psychiatric: denies mood disorder   Endocrine: denies hx of diabetes or thyroid dysfunction  Heme/Oncology/Lymph Nodes: Denies enlarged lymph nodes, denies brusing or known bleeding disorder  Allergic/Immunologic: Denies hx of allergies      All other systems were reviewed and are negative (AMA/CMS criteria)      Blood pressure 106/76, pulse (!) 54, height 1.778 m (5' 10\"), weight 93 kg (205 lb), SpO2 95 %.  General Appearance:   Well developed, Well nourished, No acute distress, Non-toxic appearance.   HENT:  Normocephalic, Atraumatic, Oropharynx moist mucous membranes, Dentition: Mallampati 4 OP, Nose normal.    Eyes:  PERRLA, EOMI, Conjunctiva normal, No discharge.  Neck:  Normal range of motion, No cervical tenderness, Supple, No stridor, no JVD .  No thyromegaly.  No carotid bruit.  Cardiovascular:  Normal heart rate, Normal rhythm,  S1, S2, no S3,  S4; No gallops; No murmurs, No rubs, .   Extremitites with intact distal pulses, no cyanosis, clubbing or edema.  No heaves, thrills, HJR;  Peripheral pulses: carotid 2+, brachial 2+, radial 2+, ulnar 2+, femoral 2+, popliteal 2+, PT 2+, DP 2+;  Lungs:  Respiratory effort is normal. Normal breath sounds, breath sounds clear to auscultation bilaterally,  no rales, no rhonchi, no wheezing.   Abdomen: Bowel sounds normal, Soft, No tenderness, No guarding, No rebound, No masses, No hepatosplenomegaly.  Skin: Warm, Dry, No erythema, No rash, no induration or crepitus.  Neurologic: Alert & oriented x 3, Normal motor function, Normal sensory function, No focal deficits noted, cranial nerves II through XII are normal,  normal gait.  Psychiatric: Affect normal, Judgment normal, Mood normal.    Results for BRANDON BUSTILLOS (MRN [id number]) as of 10/4/2017 08:06   Ref. Range 7/3/2017 08:50 8/4/2017 00:00 8/8/2017 10:02 8/23/2017 08:07 10/3/2017 09:40   WBC Latest Ref Range: 4.8 - 10.8 K/uL     5.1   RBC Latest Ref Range: 4.70 - 6.10 M/uL     " 4.73   Hemoglobin Latest Ref Range: 14.0 - 18.0 g/dL     15.6   Hematocrit Latest Ref Range: 42.0 - 52.0 %     44.5   MCV Latest Ref Range: 81.4 - 97.8 fL     94.1   MCH Latest Ref Range: 27.0 - 33.0 pg     33.0   MCHC Latest Ref Range: 33.7 - 35.3 g/dL     35.1   RDW Latest Ref Range: 35.9 - 50.0 fL     42.6   Platelet Count Latest Ref Range: 164 - 446 K/uL     201   MPV Latest Ref Range: 9.0 - 12.9 fL     10.8   Sodium Latest Ref Range: 135 - 145 mmol/L 140    138   Potassium Latest Ref Range: 3.6 - 5.5 mmol/L 4.6    4.2   Chloride Latest Ref Range: 96 - 112 mmol/L 107    105   Co2 Latest Ref Range: 20 - 33 mmol/L 28    27   Anion Gap Latest Ref Range: 0.0 - 11.9  5.0    6.0   Glucose Latest Ref Range: 65 - 99 mg/dL 112 (H)    105 (H)   Bun Latest Ref Range: 8 - 22 mg/dL 14    15   Creatinine Latest Ref Range: 0.50 - 1.40 mg/dL 0.87    0.87   GFR If  Latest Ref Range: >60 mL/min/1.73 m 2 >60    >60   GFR If Non  Latest Ref Range: >60 mL/min/1.73 m 2 >60    >60   Calcium Latest Ref Range: 8.5 - 10.5 mg/dL 9.1    9.2   AST(SGOT) Latest Ref Range: 12 - 45 U/L 21    16   ALT(SGPT) Latest Ref Range: 2 - 50 U/L 30    19   Alkaline Phosphatase Latest Ref Range: 30 - 99 U/L 61    64   Total Bilirubin Latest Ref Range: 0.1 - 1.5 mg/dL 0.6    0.4   Albumin Latest Ref Range: 3.2 - 4.9 g/dL 3.9    4.3   Total Protein Latest Ref Range: 6.0 - 8.2 g/dL 6.7    6.9   Globulin Latest Ref Range: 1.9 - 3.5 g/dL 2.8    2.6   A-G Ratio Latest Units: g/dL 1.4    1.7   Glycohemoglobin Latest Ref Range: 0.0 - 5.6 %     5.7 (H)   Estim. Avg Glu Latest Units: mg/dL     117   Cholesterol,Tot Latest Ref Range: 100 - 199 mg/dL     191   Triglycerides Latest Ref Range: 0 - 149 mg/dL     167 (H)   HDL Latest Ref Range: >=40 mg/dL     53   LDL Latest Ref Range: <100 mg/dL     105 (H)   TSH Latest Ref Range: 0.300 - 3.700 uIU/mL 1.770    2.160   ECHOCARDIOGRAM COMP W/O CONT Unknown   Rpt     Left Ventrical Ejection  Fraction Unknown   55       Assessment and Plan.   42 y.o. male has episodic palpitation with baseline bradycardia; Will advise avoid dehydration; Dietician consult to help him with MetSyn-X; Discussed with patient the OPO ordered, may order O2 supplement and/or  sleep evaluation if OPO findings are abnormal; the patient will be contacted in the future regarding my advisement.  To quit smoking    1. Palpitations  See above    2. Dizziness  none    3. Hyperglycemia    - NUTRITION (DIETARY) CONSULT    4. Obesity (BMI 30-39.9)    - OVERNIGHT PULSE OXIMETRY    5. Metabolic syndrome X    - NUTRITION (DIETARY) CONSULT  - OVERNIGHT PULSE OXIMETRY      Return to clinic in  3 , months    1. Palpitations     2. Dizziness     3. Hyperglycemia  NUTRITION (DIETARY) CONSULT   4. Obesity (BMI 30-39.9)  OVERNIGHT PULSE OXIMETRY   5. Metabolic syndrome X  NUTRITION (DIETARY) CONSULT    OVERNIGHT PULSE OXIMETRY   6. Current smoker

## 2017-10-04 NOTE — LETTER
[facility]   [address]   [address]  [place] [address]  Phone: [phone]  Fax: [phone]              Nilo Arciniega  1975    Encounter Date: 10/4/2017    JAZ PerezPSOLOMON.    Thank you for the referral. I had the pleasure of seeing Nilo Arciniega today in cardiology clinic. I've attached my visit note below. If you have any questions please feel free to give me a call anytime.      Sarkis Saini MD, PhD, [facility]  Cardiology and Lipidology  [facility]                                                                    PROGRESS NOTE:  Chief Complaint   Patient presents with   • Follow-Up     Palpitation    This patient is an established male who is here today to discuss:  One episode of palpitation last week; But baseline bradycardia    Patient Active Problem List    Diagnosis Date Noted   • Palpitations 07/03/2017   • Obesity (BMI 30-39.9) 07/03/2017       No past medical history on file.  Past Surgical History:   Procedure Laterality Date   • APPENDECTOMY       Social History     Social History   • Marital status:      Spouse name: N/A   • Number of children: N/A   • Years of education: N/A     Social History Main Topics   • Smoking status: Current Every Day Smoker     Types: Cigarettes   • Smokeless tobacco: Never Used      Comment: 1 pack per 2 weeks   • Alcohol use Yes      Comment: occas   • Drug use: No   • Sexual activity: Yes     Partners: Female     Other Topics Concern   • Not on file     Social History Narrative   • No narrative on file     Family History   Problem Relation Age of Onset   • Breast Cancer Mother    • Diabetes Father    • Hyperlipidemia Father    • Heart Attack Father        No current outpatient prescriptions on file.     No current facility-administered medications for this visit.      Review of patient's allergies indicates no known  "allergies.    Review of Systems:     Constitutional: Denies fevers, Denies weight changes  Eyes: Denies changes in vision, no eye pain  Ears/Nose/Throat/Mouth: Denies nasal congestion or sore throat   Cardiovascular: Denies chest pain but episodic  palpitations   Respiratory: Denies shortness of breath , Denies cough  Gastrointestinal/Hepatic: Denies abdominal pain, nausea, vomiting, diarrhea, constipation or GI bleeding   Genitourinary: Denies bladder dysfunction, dysuria or frequency  Musculoskeletal/Rheum: Denies  joint pain and swelling   Skin/Breast: Denies rash, denies breast lumps or discharge  Neurological: Denies headache, confusion, memory loss or focal weakness/parasthesias  Psychiatric: denies mood disorder   Endocrine: denies hx of diabetes or thyroid dysfunction  Heme/Oncology/Lymph Nodes: Denies enlarged lymph nodes, denies brusing or known bleeding disorder  Allergic/Immunologic: Denies hx of allergies      All other systems were reviewed and are negative (AMA/CMS criteria)      Blood pressure 106/76, pulse (!) 54, height 1.778 m (5' 10\"), weight 93 kg (205 lb), SpO2 95 %.  General Appearance:   Well developed, Well nourished, No acute distress, Non-toxic appearance.   HENT:  Normocephalic, Atraumatic, Oropharynx moist mucous membranes, Dentition: Mallampati 4 OP, Nose normal.    Eyes:  PERRLA, EOMI, Conjunctiva normal, No discharge.  Neck:  Normal range of motion, No cervical tenderness, Supple, No stridor, no JVD .  No thyromegaly.  No carotid bruit.  Cardiovascular:  Normal heart rate, Normal rhythm,  S1, S2, no S3,  S4; No gallops; No murmurs, No rubs, .   Extremitites with intact distal pulses, no cyanosis, clubbing or edema.  No heaves, thrills, HJR;  Peripheral pulses: carotid 2+, brachial 2+, radial 2+, ulnar 2+, femoral 2+, popliteal 2+, PT 2+, DP 2+;  Lungs:  Respiratory effort is normal. Normal breath sounds, breath sounds clear to auscultation bilaterally,  no rales, no rhonchi, no " wheezing.   Abdomen: Bowel sounds normal, Soft, No tenderness, No guarding, No rebound, No masses, No hepatosplenomegaly.  Skin: Warm, Dry, No erythema, No rash, no induration or crepitus.  Neurologic: Alert & oriented x 3, Normal motor function, Normal sensory function, No focal deficits noted, cranial nerves II through XII are normal,  normal gait.  Psychiatric: Affect normal, Judgment normal, Mood normal.    Results for PYLEBRANDON CARRERA JENNY (MRN [id number]) as of 10/4/2017 08:06   Ref. Range 7/3/2017 08:50 8/4/2017 00:00 8/8/2017 10:02 8/23/2017 08:07 10/3/2017 09:40   WBC Latest Ref Range: 4.8 - 10.8 K/uL     5.1   RBC Latest Ref Range: 4.70 - 6.10 M/uL     4.73   Hemoglobin Latest Ref Range: 14.0 - 18.0 g/dL     15.6   Hematocrit Latest Ref Range: 42.0 - 52.0 %     44.5   MCV Latest Ref Range: 81.4 - 97.8 fL     94.1   MCH Latest Ref Range: 27.0 - 33.0 pg     33.0   MCHC Latest Ref Range: 33.7 - 35.3 g/dL     35.1   RDW Latest Ref Range: 35.9 - 50.0 fL     42.6   Platelet Count Latest Ref Range: 164 - 446 K/uL     201   MPV Latest Ref Range: 9.0 - 12.9 fL     10.8   Sodium Latest Ref Range: 135 - 145 mmol/L 140    138   Potassium Latest Ref Range: 3.6 - 5.5 mmol/L 4.6    4.2   Chloride Latest Ref Range: 96 - 112 mmol/L 107    105   Co2 Latest Ref Range: 20 - 33 mmol/L 28    27   Anion Gap Latest Ref Range: 0.0 - 11.9  5.0    6.0   Glucose Latest Ref Range: 65 - 99 mg/dL 112 (H)    105 (H)   Bun Latest Ref Range: 8 - 22 mg/dL 14    15   Creatinine Latest Ref Range: 0.50 - 1.40 mg/dL 0.87    0.87   GFR If  Latest Ref Range: >60 mL/min/1.73 m 2 >60    >60   GFR If Non  Latest Ref Range: >60 mL/min/1.73 m 2 >60    >60   Calcium Latest Ref Range: 8.5 - 10.5 mg/dL 9.1    9.2   AST(SGOT) Latest Ref Range: 12 - 45 U/L 21    16   ALT(SGPT) Latest Ref Range: 2 - 50 U/L 30    19   Alkaline Phosphatase Latest Ref Range: 30 - 99 U/L 61    64   Total Bilirubin Latest Ref Range: 0.1 - 1.5  mg/dL 0.6    0.4   Albumin Latest Ref Range: 3.2 - 4.9 g/dL 3.9    4.3   Total Protein Latest Ref Range: 6.0 - 8.2 g/dL 6.7    6.9   Globulin Latest Ref Range: 1.9 - 3.5 g/dL 2.8    2.6   A-G Ratio Latest Units: g/dL 1.4    1.7   Glycohemoglobin Latest Ref Range: 0.0 - 5.6 %     5.7 (H)   Estim. Avg Glu Latest Units: mg/dL     117   Cholesterol,Tot Latest Ref Range: 100 - 199 mg/dL     191   Triglycerides Latest Ref Range: 0 - 149 mg/dL     167 (H)   HDL Latest Ref Range: >=40 mg/dL     53   LDL Latest Ref Range: <100 mg/dL     105 (H)   TSH Latest Ref Range: 0.300 - 3.700 uIU/mL 1.770    2.160   ECHOCARDIOGRAM COMP W/O CONT Unknown   Rpt     Left Ventrical Ejection Fraction Unknown   55       Assessment and Plan.   42 y.o. male has episodic palpitation with baseline bradycardia; Will advise avoid dehydration; Dietician consult to help him with MetSyn-X; Discussed with patient the OPO ordered, may order O2 supplement and/or  sleep evaluation if OPO findings are abnormal; the patient will be contacted in the future regarding my advisement.      1. Palpitations  See above    2. Dizziness  none    3. Hyperglycemia    - NUTRITION (DIETARY) CONSULT    4. Obesity (BMI 30-39.9)    - OVERNIGHT PULSE OXIMETRY    5. Metabolic syndrome X    - NUTRITION (DIETARY) CONSULT  - OVERNIGHT PULSE OXIMETRY      Return to clinic in  3 , months    1. Palpitations     2. Dizziness     3. Hyperglycemia  NUTRITION (DIETARY) CONSULT   4. Obesity (BMI 30-39.9)  OVERNIGHT PULSE OXIMETRY   5. Metabolic syndrome X  NUTRITION (DIETARY) CONSULT    OVERNIGHT PULSE OXIMETRY         Jorge Lugo, A.P.N.  [address]  [facility] [address]  [place] [address]  VIA In Basket

## 2018-10-05 ENCOUNTER — TELEPHONE (OUTPATIENT)
Dept: MEDICAL GROUP | Facility: MEDICAL CENTER | Age: 43
End: 2018-10-05

## 2018-10-05 DIAGNOSIS — Z23 NEED FOR VACCINATION: ICD-10-CM

## 2018-10-05 NOTE — TELEPHONE ENCOUNTER
Patient is on the MA Schedule today for FLU, PPSV23 & TDAP vaccine/injection.    SPECIFIC Action To Be Taken: Orders pending, please sign.

## 2019-11-21 ENCOUNTER — OFFICE VISIT (OUTPATIENT)
Dept: MEDICAL GROUP | Facility: MEDICAL CENTER | Age: 44
End: 2019-11-21
Payer: COMMERCIAL

## 2019-11-21 VITALS
RESPIRATION RATE: 16 BRPM | HEART RATE: 70 BPM | OXYGEN SATURATION: 96 % | HEIGHT: 68 IN | DIASTOLIC BLOOD PRESSURE: 74 MMHG | BODY MASS INDEX: 34.86 KG/M2 | WEIGHT: 230 LBS | SYSTOLIC BLOOD PRESSURE: 130 MMHG

## 2019-11-21 DIAGNOSIS — E66.9 OBESITY (BMI 30-39.9): ICD-10-CM

## 2019-11-21 DIAGNOSIS — L29.9 PRURITUS: ICD-10-CM

## 2019-11-21 DIAGNOSIS — R53.83 FATIGUE, UNSPECIFIED TYPE: ICD-10-CM

## 2019-11-21 DIAGNOSIS — R73.01 IMPAIRED FASTING GLUCOSE: ICD-10-CM

## 2019-11-21 DIAGNOSIS — G47.30 SLEEP DISORDER BREATHING: ICD-10-CM

## 2019-11-21 DIAGNOSIS — Z00.00 ROUTINE GENERAL MEDICAL EXAMINATION AT A HEALTH CARE FACILITY: ICD-10-CM

## 2019-11-21 PROCEDURE — 99214 OFFICE O/P EST MOD 30 MIN: CPT | Performed by: NURSE PRACTITIONER

## 2019-11-21 RX ORDER — TRIAMCINOLONE ACETONIDE 1 MG/G
1 OINTMENT TOPICAL 2 TIMES DAILY
Qty: 1 TUBE | Refills: 0 | Status: SHIPPED | OUTPATIENT
Start: 2019-11-21 | End: 2020-06-17

## 2019-11-21 RX ORDER — HYDROXYZINE HYDROCHLORIDE 25 MG/1
25 TABLET, FILM COATED ORAL 3 TIMES DAILY PRN
Qty: 30 TAB | Refills: 0 | Status: CANCELLED | OUTPATIENT
Start: 2019-11-21

## 2019-11-21 ASSESSMENT — ENCOUNTER SYMPTOMS: INSOMNIA: 1

## 2019-11-21 ASSESSMENT — PATIENT HEALTH QUESTIONNAIRE - PHQ9: CLINICAL INTERPRETATION OF PHQ2 SCORE: 0

## 2019-11-21 NOTE — PROGRESS NOTES
Subjective:      Nilo Arciniega is a 44 y.o. male who presents with Dizziness (body itchines)        CC: Patient here today for a number of issues including fatigue, pruritus, possible sleep apnea and need of blood work.  Patient's last visit to this office was for his initial visit in 2017    HPI       1. Sleep disorder breathing  Patient admits that he has issues at night including apnea episodes and loud snoring.  He feels tired throughout the day.  He saw cardiology in 2017 for palpitations with testing negative but overnight pulse oximetry recommended but not done.    2. Fatigue, unspecified type  Patient states for at least a year he is felt fatigued during the day and often has to take a nap.  Previous lab work from 2017 showed normal TSH, CBC and chemistry panel except for blood sugar and hemoglobin A1c was mildly elevated at 5.7    3. Pruritus  Patient states he tends to feel pruritic frequently after showering and scratches at his skin.    4. Impaired fasting glucose  Previous hemoglobin A1c of 5.7 from 2 years ago    5. Obesity (BMI 30-39.9)  Patient overweight    6. Routine general medical examination at a health care facility  Patient due for routine blood work  History reviewed. No pertinent past medical history.  Social History     Socioeconomic History   • Marital status:      Spouse name: Not on file   • Number of children: Not on file   • Years of education: Not on file   • Highest education level: Not on file   Occupational History   • Not on file   Social Needs   • Financial resource strain: Not on file   • Food insecurity:     Worry: Not on file     Inability: Not on file   • Transportation needs:     Medical: Not on file     Non-medical: Not on file   Tobacco Use   • Smoking status: Current Every Day Smoker     Types: Cigarettes   • Smokeless tobacco: Never Used   • Tobacco comment: 1 pack per 2 weeks   Substance and Sexual Activity   • Alcohol use: Yes     Comment: occas  "  • Drug use: No   • Sexual activity: Yes     Partners: Female   Lifestyle   • Physical activity:     Days per week: Not on file     Minutes per session: Not on file   • Stress: Not on file   Relationships   • Social connections:     Talks on phone: Not on file     Gets together: Not on file     Attends [employer] service: Not on file     Active member of club or organization: Not on file     Attends meetings of clubs or organizations: Not on file     Relationship status: Not on file   • Intimate partner violence:     Fear of current or ex partner: Not on file     Emotionally abused: Not on file     Physically abused: Not on file     Forced sexual activity: Not on file   Other Topics Concern   • Not on file   Social History Narrative   • Not on file     Current Outpatient Medications   Medication Sig Dispense Refill   • triamcinolone acetonide (KENALOG) 0.1 % Ointment Apply 1 Application to affected area(s) 2 times a day. 1 Tube 0     No current facility-administered medications for this visit.      Family History   Problem Relation Age of Onset   • Breast Cancer Mother    • Diabetes Father    • Hyperlipidemia Father    • Heart Attack Father          Review of Systems   Constitutional: Positive for malaise/fatigue.   Skin: Positive for itching.   Psychiatric/Behavioral: The patient has insomnia.    All other systems reviewed and are negative.         Objective:     /74 (BP Location: Right arm, Patient Position: Sitting, BP Cuff Size: Adult)   Pulse 70   Resp 16   Ht 1.727 m (5' 8\")   Wt 104.3 kg (230 lb)   SpO2 96%   BMI 34.97 kg/m²      Physical Exam  Vitals signs and nursing note reviewed.   Constitutional:       General: He is not in acute distress.     Appearance: He is well-developed. He is not diaphoretic.   HENT:      Head: Normocephalic and atraumatic.      Right Ear: External ear normal.      Left Ear: External ear normal.      Nose: Nose normal.   Eyes:      General:         Right eye: No " discharge.         Left eye: No discharge.      Conjunctiva/sclera: Conjunctivae normal.   Neck:      Musculoskeletal: Normal range of motion and neck supple.      Thyroid: No thyromegaly.      Trachea: No tracheal deviation.   Cardiovascular:      Rate and Rhythm: Normal rate and regular rhythm.      Heart sounds: Normal heart sounds. No murmur.   Pulmonary:      Effort: Pulmonary effort is normal. No respiratory distress.      Breath sounds: Normal breath sounds. No wheezing or rales.   Lymphadenopathy:      Cervical: No cervical adenopathy.   Skin:     General: Skin is warm and dry.      Findings: No erythema or rash.      Comments: No rash noted   Neurological:      Mental Status: He is alert and oriented to person, place, and time.      Coordination: Coordination normal.   Psychiatric:         Behavior: Behavior normal.         Thought Content: Thought content normal.         Judgment: Judgment normal.                 Assessment/Plan:       1. Sleep disorder breathing  Patient symptoms suspicious for possible sleep apnea and he is obese and has a large neck so I will refer him to the sleep center for further evaluation  - REFERRAL TO SLEEP STUDIES    2. Fatigue, unspecified type  Patient advised this could be secondary to sleep apnea but I will also look for thyroid disease, anemia and diabetes  - TSH; Future    3. Pruritus  I recommended not using excessive amounts of soap to areas that do not need to be soaped up regularly.  I recommended moisturizing the skin frequently and he was given triamcinolone to use short-term to just the most pruritic areas  - triamcinolone acetonide (KENALOG) 0.1 % Ointment; Apply 1 Application to affected area(s) 2 times a day.  Dispense: 1 Tube; Refill: 0    4. Impaired fasting glucose  Patient advised he is at risk for type 2 diabetes and needs repeat lab work  - HEMOGLOBIN A1C; Future    5. Obesity (BMI 30-39.9)    - Patient identified as having weight management issue.   Appropriate orders and counseling given.    6. Routine general medical examination at a health care facility    - CBC WITHOUT DIFFERENTIAL; Future  - Comp Metabolic Panel; Future  - URINALYSIS,CULTURE IF INDICATED; Future

## 2019-11-22 ENCOUNTER — HOSPITAL ENCOUNTER (OUTPATIENT)
Dept: LAB | Facility: MEDICAL CENTER | Age: 44
End: 2019-11-22
Attending: NURSE PRACTITIONER
Payer: COMMERCIAL

## 2019-11-22 DIAGNOSIS — R53.83 FATIGUE, UNSPECIFIED TYPE: ICD-10-CM

## 2019-11-22 DIAGNOSIS — Z00.00 ROUTINE GENERAL MEDICAL EXAMINATION AT A HEALTH CARE FACILITY: ICD-10-CM

## 2019-11-22 DIAGNOSIS — R73.01 IMPAIRED FASTING GLUCOSE: ICD-10-CM

## 2019-11-22 LAB
ALBUMIN SERPL BCP-MCNC: 4.1 G/DL (ref 3.2–4.9)
ALBUMIN/GLOB SERPL: 1.9 G/DL
ALP SERPL-CCNC: 64 U/L (ref 30–99)
ALT SERPL-CCNC: 18 U/L (ref 2–50)
ANION GAP SERPL CALC-SCNC: 8 MMOL/L (ref 0–11.9)
APPEARANCE UR: CLEAR
AST SERPL-CCNC: 15 U/L (ref 12–45)
BILIRUB SERPL-MCNC: 0.5 MG/DL (ref 0.1–1.5)
BILIRUB UR QL STRIP.AUTO: NEGATIVE
BUN SERPL-MCNC: 16 MG/DL (ref 8–22)
CALCIUM SERPL-MCNC: 8.4 MG/DL (ref 8.5–10.5)
CHLORIDE SERPL-SCNC: 106 MMOL/L (ref 96–112)
CO2 SERPL-SCNC: 26 MMOL/L (ref 20–33)
COLOR UR: YELLOW
CREAT SERPL-MCNC: 0.88 MG/DL (ref 0.5–1.4)
ERYTHROCYTE [DISTWIDTH] IN BLOOD BY AUTOMATED COUNT: 45.6 FL (ref 35.9–50)
GLOBULIN SER CALC-MCNC: 2.2 G/DL (ref 1.9–3.5)
GLUCOSE SERPL-MCNC: 97 MG/DL (ref 65–99)
GLUCOSE UR STRIP.AUTO-MCNC: NEGATIVE MG/DL
HCT VFR BLD AUTO: 46.4 % (ref 42–52)
HGB BLD-MCNC: 16.1 G/DL (ref 14–18)
KETONES UR STRIP.AUTO-MCNC: NEGATIVE MG/DL
LEUKOCYTE ESTERASE UR QL STRIP.AUTO: NEGATIVE
MCH RBC QN AUTO: 33.4 PG (ref 27–33)
MCHC RBC AUTO-ENTMCNC: 34.7 G/DL (ref 33.7–35.3)
MCV RBC AUTO: 96.3 FL (ref 81.4–97.8)
MICRO URNS: NORMAL
NITRITE UR QL STRIP.AUTO: NEGATIVE
PH UR STRIP.AUTO: 6.5 [PH] (ref 5–8)
PLATELET # BLD AUTO: 168 K/UL (ref 164–446)
PMV BLD AUTO: 11.7 FL (ref 9–12.9)
POTASSIUM SERPL-SCNC: 4.1 MMOL/L (ref 3.6–5.5)
PROT SERPL-MCNC: 6.3 G/DL (ref 6–8.2)
PROT UR QL STRIP: NEGATIVE MG/DL
RBC # BLD AUTO: 4.82 M/UL (ref 4.7–6.1)
RBC UR QL AUTO: NEGATIVE
SODIUM SERPL-SCNC: 140 MMOL/L (ref 135–145)
SP GR UR STRIP.AUTO: 1.02
UROBILINOGEN UR STRIP.AUTO-MCNC: 0.2 MG/DL
WBC # BLD AUTO: 4.8 K/UL (ref 4.8–10.8)

## 2019-11-22 PROCEDURE — 85027 COMPLETE CBC AUTOMATED: CPT

## 2019-11-22 PROCEDURE — 81003 URINALYSIS AUTO W/O SCOPE: CPT

## 2019-11-22 PROCEDURE — 80053 COMPREHEN METABOLIC PANEL: CPT

## 2019-11-22 PROCEDURE — 83036 HEMOGLOBIN GLYCOSYLATED A1C: CPT

## 2019-11-22 PROCEDURE — 84443 ASSAY THYROID STIM HORMONE: CPT

## 2019-11-22 PROCEDURE — 36415 COLL VENOUS BLD VENIPUNCTURE: CPT

## 2019-11-23 LAB
EST. AVERAGE GLUCOSE BLD GHB EST-MCNC: 123 MG/DL
HBA1C MFR BLD: 5.9 % (ref 0–5.6)
TSH SERPL DL<=0.005 MIU/L-ACNC: 1.43 UIU/ML (ref 0.38–5.33)

## 2019-11-27 ENCOUNTER — OFFICE VISIT (OUTPATIENT)
Dept: MEDICAL GROUP | Facility: MEDICAL CENTER | Age: 44
End: 2019-11-27
Payer: COMMERCIAL

## 2019-11-27 VITALS
WEIGHT: 229 LBS | HEART RATE: 61 BPM | HEIGHT: 68 IN | BODY MASS INDEX: 34.71 KG/M2 | TEMPERATURE: 98.3 F | DIASTOLIC BLOOD PRESSURE: 74 MMHG | SYSTOLIC BLOOD PRESSURE: 128 MMHG | OXYGEN SATURATION: 99 %

## 2019-11-27 DIAGNOSIS — E83.51 HYPOCALCEMIA: ICD-10-CM

## 2019-11-27 DIAGNOSIS — Z23 NEED FOR INFLUENZA VACCINATION: ICD-10-CM

## 2019-11-27 DIAGNOSIS — R73.01 IMPAIRED FASTING GLUCOSE: ICD-10-CM

## 2019-11-27 PROCEDURE — 90471 IMMUNIZATION ADMIN: CPT | Performed by: NURSE PRACTITIONER

## 2019-11-27 PROCEDURE — 99213 OFFICE O/P EST LOW 20 MIN: CPT | Mod: 25 | Performed by: NURSE PRACTITIONER

## 2019-11-27 PROCEDURE — 90686 IIV4 VACC NO PRSV 0.5 ML IM: CPT | Performed by: NURSE PRACTITIONER

## 2019-11-27 NOTE — PROGRESS NOTES
Subjective:      Nilo Muniz is a 44 y.o. male who presents with Follow-Up and Labs Only        CC: Patient is here today for follow-up on recent labs showing low calcium and elevated hemoglobin A1c.    HPI       1. Hypocalcemia  Patient recently established with a clinic and routine lab work was ordered.  His chemistry panel came back normal except mildly low calcium at 8.4.  His CBC, urinalysis and TSH was normal.  He states he does work at a job where he is often in high heat situations.    2. Impaired fasting glucose  Patient was noted on his initial visit to have previous hemoglobin A1c slightly elevated so repeat hemoglobin A1c was done and it came back at 5.9.  Patient is obese and does drink a lot of Gatorade for his job.  He also does not follow a low-carb diet    3. Need for influenza vaccination  Patient due for vaccine  History reviewed. No pertinent past medical history.  Social History     Socioeconomic History   • Marital status:      Spouse name: Not on file   • Number of children: Not on file   • Years of education: Not on file   • Highest education level: Not on file   Occupational History   • Not on file   Social Needs   • Financial resource strain: Not on file   • Food insecurity:     Worry: Not on file     Inability: Not on file   • Transportation needs:     Medical: Not on file     Non-medical: Not on file   Tobacco Use   • Smoking status: Current Every Day Smoker     Types: Cigarettes   • Smokeless tobacco: Never Used   • Tobacco comment: 1 pack per 2 weeks   Substance and Sexual Activity   • Alcohol use: Yes     Comment: occas   • Drug use: No   • Sexual activity: Yes     Partners: Female   Lifestyle   • Physical activity:     Days per week: Not on file     Minutes per session: Not on file   • Stress: Not on file   Relationships   • Social connections:     Talks on phone: Not on file     Gets together: Not on file     Attends Baptism service: Not on file     Active  "member of club or organization: Not on file     Attends meetings of clubs or organizations: Not on file     Relationship status: Not on file   • Intimate partner violence:     Fear of current or ex partner: Not on file     Emotionally abused: Not on file     Physically abused: Not on file     Forced sexual activity: Not on file   Other Topics Concern   • Not on file   Social History Narrative   • Not on file     Current Outpatient Medications   Medication Sig Dispense Refill   • triamcinolone acetonide (KENALOG) 0.1 % Ointment Apply 1 Application to affected area(s) 2 times a day. 1 Tube 0     No current facility-administered medications for this visit.      Family History   Problem Relation Age of Onset   • Breast Cancer Mother    • Diabetes Father    • Hyperlipidemia Father    • Heart Attack Father          Review of Systems   All other systems reviewed and are negative.         Objective:     /74 (BP Location: Right arm, Patient Position: Sitting)   Pulse 61   Temp 36.8 °C (98.3 °F)   Ht 1.727 m (5' 8\")   Wt 103.9 kg (229 lb)   SpO2 99%   BMI 34.82 kg/m²      Physical Exam  Vitals signs and nursing note reviewed.   Constitutional:       General: He is not in acute distress.     Appearance: He is well-developed. He is not diaphoretic.   HENT:      Head: Normocephalic and atraumatic.      Right Ear: External ear normal.      Left Ear: External ear normal.      Nose: Nose normal.   Eyes:      General:         Right eye: No discharge.         Left eye: No discharge.      Conjunctiva/sclera: Conjunctivae normal.   Neck:      Musculoskeletal: Normal range of motion and neck supple.      Thyroid: No thyromegaly.      Trachea: No tracheal deviation.   Cardiovascular:      Rate and Rhythm: Normal rate and regular rhythm.      Heart sounds: Normal heart sounds. No murmur.   Pulmonary:      Effort: Pulmonary effort is normal. No respiratory distress.      Breath sounds: Normal breath sounds. No wheezing or " rales.   Lymphadenopathy:      Cervical: No cervical adenopathy.   Skin:     General: Skin is warm and dry.      Findings: No erythema or rash.   Neurological:      Mental Status: He is alert and oriented to person, place, and time.      Coordination: Coordination normal.   Psychiatric:         Behavior: Behavior normal.         Thought Content: Thought content normal.         Judgment: Judgment normal.                 Assessment/Plan:       1. Hypocalcemia  Patient's results are only 1/10 of a point off but I would like to do a repeat chemistry panel and ionized calcium and had advised him to start on over-the-counter vitamin D last week.  If he continues to show low I will do a PTH and refer him to endocrinology  - Comp Metabolic Panel; Future  - CALCIUM IONIZED; Future    2. Impaired fasting glucose  I reviewed with patient how to read labels so that he chooses low-carb options to prevent becoming diabetic in the future.  He also needs to lose weight and I recommended switching to low sugar or no sugar Gatorade in the future.  - Comp Metabolic Panel; Future  - CALCIUM IONIZED; Future    3. Need for influenza vaccination  I have placed the below orders and discussed them with an approved delegating provider. The MA is performing the below orders under the direction of Dr. Cooley    - Influenza Vaccine Quad Injection (PF)

## 2019-12-18 ENCOUNTER — OFFICE VISIT (OUTPATIENT)
Dept: MEDICAL GROUP | Facility: MEDICAL CENTER | Age: 44
End: 2019-12-18
Payer: COMMERCIAL

## 2019-12-18 VITALS
DIASTOLIC BLOOD PRESSURE: 70 MMHG | SYSTOLIC BLOOD PRESSURE: 130 MMHG | HEART RATE: 67 BPM | OXYGEN SATURATION: 97 % | BODY MASS INDEX: 34.56 KG/M2 | RESPIRATION RATE: 16 BRPM | HEIGHT: 68 IN | WEIGHT: 228 LBS

## 2019-12-18 DIAGNOSIS — E83.51 HYPOCALCEMIA: ICD-10-CM

## 2019-12-18 DIAGNOSIS — L65.9 HAIR LOSS: ICD-10-CM

## 2019-12-18 DIAGNOSIS — L29.9 PRURITUS: ICD-10-CM

## 2019-12-18 DIAGNOSIS — R73.01 IMPAIRED FASTING GLUCOSE: ICD-10-CM

## 2019-12-18 PROCEDURE — 99214 OFFICE O/P EST MOD 30 MIN: CPT | Performed by: NURSE PRACTITIONER

## 2019-12-18 RX ORDER — TRIAMCINOLONE ACETONIDE 1 MG/G
1 OINTMENT TOPICAL 2 TIMES DAILY
Qty: 1 TUBE | Refills: 0 | Status: SHIPPED | OUTPATIENT
Start: 2019-12-18 | End: 2020-06-17

## 2019-12-18 RX ORDER — HYDROXYZINE HYDROCHLORIDE 25 MG/1
25 TABLET, FILM COATED ORAL 3 TIMES DAILY PRN
Qty: 30 TAB | Refills: 0 | Status: SHIPPED | OUTPATIENT
Start: 2019-12-18 | End: 2020-10-23 | Stop reason: SDUPTHER

## 2019-12-18 NOTE — PROGRESS NOTES
Subjective:      Nilo Muniz is a 44 y.o. male who presents with Itching (all over the body and hair loss)        CC: Patient here today for complaints of generalized pruritus and hearing loss.  He is also here to review his recent lab results.    HPI       1. Pruritus  Patient reports he has had generalized pruritus for a number of weeks.  I had made some suggestions to him 3 weeks ago and gave him some triamcinolone cream to use for the especially itchy areas but he states he still tends to scratch, especially after showering.  He states he occasionally gets a rash but it comes and goes.  He does not currently have a rash.    2. Hair loss  Patient states he also has noticed that he is losing more of his scalp hair recently.  It has not been coming out in patches.  He did a recent TSH which came back normal.  He states he sometimes has pruritus of the scalp.    3. Impaired fasting glucose  Hemoglobin A1c comes back in the prediabetes range of 5.9 and he has had similar readings in the past.    4. Hypocalcemia  His most recent lab work including TSH, hemoglobin A1c, CBC, urine and chemistry panel were normal except for a very mild decrease in calcium at 8.4.  I advised him to start an over-the-counter vitamin D and then we would recheck a chemistry panel and a ionized calcium in 1 month.  History reviewed. No pertinent past medical history.  Social History     Socioeconomic History   • Marital status:      Spouse name: Not on file   • Number of children: Not on file   • Years of education: Not on file   • Highest education level: Not on file   Occupational History   • Not on file   Social Needs   • Financial resource strain: Not on file   • Food insecurity:     Worry: Not on file     Inability: Not on file   • Transportation needs:     Medical: Not on file     Non-medical: Not on file   Tobacco Use   • Smoking status: Current Every Day Smoker     Types: Cigarettes   • Smokeless tobacco: Never  "Used   • Tobacco comment: 1 pack per 2 weeks   Substance and Sexual Activity   • Alcohol use: Yes     Comment: occas   • Drug use: No   • Sexual activity: Yes     Partners: Female   Lifestyle   • Physical activity:     Days per week: Not on file     Minutes per session: Not on file   • Stress: Not on file   Relationships   • Social connections:     Talks on phone: Not on file     Gets together: Not on file     Attends Caodaism service: Not on file     Active member of club or organization: Not on file     Attends meetings of clubs or organizations: Not on file     Relationship status: Not on file   • Intimate partner violence:     Fear of current or ex partner: Not on file     Emotionally abused: Not on file     Physically abused: Not on file     Forced sexual activity: Not on file   Other Topics Concern   • Not on file   Social History Narrative   • Not on file     Current Outpatient Medications   Medication Sig Dispense Refill   • triamcinolone acetonide (KENALOG) 0.1 % Ointment Apply 1 Application to affected area(s) 2 times a day. 1 Tube 0   • hydrOXYzine HCl (ATARAX) 25 MG Tab Take 1 Tab by mouth 3 times a day as needed for Itching. 30 Tab 0   • triamcinolone acetonide (KENALOG) 0.1 % Ointment Apply 1 Application to affected area(s) 2 times a day. 1 Tube 0     No current facility-administered medications for this visit.      Family History   Problem Relation Age of Onset   • Breast Cancer Mother    • Diabetes Father    • Hyperlipidemia Father    • Heart Attack Father          Review of Systems   Skin: Positive for itching.   All other systems reviewed and are negative.         Objective:     /70 (BP Location: Right arm, Patient Position: Sitting, BP Cuff Size: Adult)   Pulse 67   Resp 16   Ht 1.727 m (5' 8\")   Wt 103.4 kg (228 lb)   SpO2 97%   BMI 34.67 kg/m²      Physical Exam  Vitals signs and nursing note reviewed.   Constitutional:       General: He is not in acute distress.     Appearance: He " is well-developed. He is not diaphoretic.   HENT:      Head: Normocephalic and atraumatic.      Right Ear: External ear normal.      Left Ear: External ear normal.      Nose: Nose normal.   Eyes:      General:         Right eye: No discharge.         Left eye: No discharge.      Conjunctiva/sclera: Conjunctivae normal.   Neck:      Musculoskeletal: Normal range of motion and neck supple.      Thyroid: No thyromegaly.      Trachea: No tracheal deviation.   Cardiovascular:      Rate and Rhythm: Normal rate and regular rhythm.      Heart sounds: Normal heart sounds. No murmur.   Pulmonary:      Effort: Pulmonary effort is normal. No respiratory distress.      Breath sounds: Normal breath sounds. No wheezing or rales.   Lymphadenopathy:      Cervical: No cervical adenopathy.   Skin:     General: Skin is warm and dry.      Findings: No erythema or rash.      Comments: No obvious gross hair loss of the scalp which is slightly scaly.  No skin rash elsewhere.   Neurological:      Mental Status: He is alert and oriented to person, place, and time.      Coordination: Coordination normal.   Psychiatric:         Behavior: Behavior normal.         Thought Content: Thought content normal.         Judgment: Judgment normal.                 Assessment/Plan:       1. Pruritus  I advised patient to use moisturizer at least once or twice a day after showering and later at night and he may try the triamcinolone ointment short-term only to the especially pruritic areas.  He may try hydroxyzine at night with the warnings about drowsiness.  - triamcinolone acetonide (KENALOG) 0.1 % Ointment; Apply 1 Application to affected area(s) 2 times a day.  Dispense: 1 Tube; Refill: 0  - hydrOXYzine HCl (ATARAX) 25 MG Tab; Take 1 Tab by mouth 3 times a day as needed for Itching.  Dispense: 30 Tab; Refill: 0  - REFERRAL TO DERMATOLOGY    2. Hair loss  Thyroid testing is normal as is most of his other lab work and nothing shows cause for hair loss so  I will refer him to dermatology  - REFERRAL TO DERMATOLOGY    3. Impaired fasting glucose  Patient continues to show prediabetes and does not need medicine but I advised low-carb diet and weight loss    4. Hypocalcemia  Labs are just 1/10 of a point below normal so I will recheck that if it continues to show hypocalcemia, I will do further work-up

## 2020-01-28 ENCOUNTER — OFFICE VISIT (OUTPATIENT)
Dept: DERMATOLOGY | Facility: IMAGING CENTER | Age: 45
End: 2020-01-28
Payer: COMMERCIAL

## 2020-01-28 VITALS — WEIGHT: 226 LBS | BODY MASS INDEX: 34.25 KG/M2 | TEMPERATURE: 98.8 F | HEIGHT: 68 IN

## 2020-01-28 DIAGNOSIS — L74.0 MILIARIA RUBRA: ICD-10-CM

## 2020-01-28 DIAGNOSIS — L29.9 PRURITUS: ICD-10-CM

## 2020-01-28 DIAGNOSIS — L21.9 SEBORRHEIC DERMATITIS: ICD-10-CM

## 2020-01-28 PROCEDURE — 99203 OFFICE O/P NEW LOW 30 MIN: CPT | Performed by: DERMATOLOGY

## 2020-01-28 RX ORDER — KETOCONAZOLE 20 MG/ML
SHAMPOO TOPICAL
Qty: 1 BOTTLE | Refills: 4 | Status: SHIPPED | OUTPATIENT
Start: 2020-01-28 | End: 2020-06-17

## 2020-01-28 NOTE — PROGRESS NOTES
DERMATOLOGY NOTE  NEW VISIT       Chief complaint: Establish Care       Nilo Muniz is a 44 y.o. male who presents for     Almost 1 yr, Itching all over body, worse when getting out of shower.  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  Atarax does help so pt can sleep, triamcinolone helps sometimes.  Does have tiny red bumps on chest and back. Uses triamcinolone only here and there and it helps a little.  His wife says bumps come and go on back.  Also notes some itching of the scalp and feels like he is losing more hair recently.    New medications (in the last year): denies  Other exposures: denies new skin care products, detergent, soaps prior to initiation of rash (since rash started has tried new products without improvement)    History of skin cancer: No  History of precancers/actinic keratoses: No  History of biopsies:No  History of blistering/severe sunburns: as a child  Family history of skin cancer:No  Family history of atypical moles:No      History reviewed. No pertinent past medical history.     Family History   Problem Relation Age of Onset   • Breast Cancer Mother    • Diabetes Father    • Hyperlipidemia Father    • Heart Attack Father         Social History     Socioeconomic History   • Marital status:      Spouse name: Not on file   • Number of children: Not on file   • Years of education: Not on file   • Highest education level: Not on file   Occupational History   • Not on file   Social Needs   • Financial resource strain: Not on file   • Food insecurity:     Worry: Not on file     Inability: Not on file   • Transportation needs:     Medical: Not on file     Non-medical: Not on file   Tobacco Use   • Smoking status: Former Smoker     Packs/day: 0.00     Types: Cigarettes     Start date: 1/28/2019   • Smokeless tobacco: Never Used   Substance and Sexual Activity   • Alcohol use: Yes     Comment: occas   • Drug use: No   • Sexual activity: Yes     Partners: Female   Lifestyle  "  • Physical activity:     Days per week: Not on file     Minutes per session: Not on file   • Stress: Not on file   Relationships   • Social connections:     Talks on phone: Not on file     Gets together: Not on file     Attends Gnosticism service: Not on file     Active member of club or organization: Not on file     Attends meetings of clubs or organizations: Not on file     Relationship status: Not on file   • Intimate partner violence:     Fear of current or ex partner: Not on file     Emotionally abused: Not on file     Physically abused: Not on file     Forced sexual activity: Not on file   Other Topics Concern   • Not on file   Social History Narrative   • Not on file        No Known Allergies     MEDICATIONS:  Medications relevant to specialty reviewed.    Current Outpatient Medications:   •  triamcinolone acetonide (KENALOG) 0.1 % Ointment, Apply 1 Application to affected area(s) 2 times a day., Disp: 1 Tube, Rfl: 0  •  hydrOXYzine HCl (ATARAX) 25 MG Tab, Take 1 Tab by mouth 3 times a day as needed for Itching., Disp: 30 Tab, Rfl: 0  •  triamcinolone acetonide (KENALOG) 0.1 % Ointment, Apply 1 Application to affected area(s) 2 times a day., Disp: 1 Tube, Rfl: 0     REVIEW OF SYSTEMS:   Positive for skin (see HPI)  Negative for fevers, chills, weight loss, fatigue, nausea, vomiting, sore throat and cough  All other systems reviewed and are negative.     EXAM:  Temp 37.1 °C (98.8 °F)   Ht 1.727 m (5' 8\")   Wt 102.5 kg (226 lb)   BMI 34.36 kg/m²   Constitutional: Well-developed, well-nourished, and in no distress.   HENT:   Head: normocephalic and atraumatic.  Right Ear: External ear normal.   Left Ear: External ear normal.   Nose: Nose normal.   Pulmonary/Chest: Effort normal.   Neurological: Alert and oriented.    A focused cutaneous exam was completed including: scalp, hair, ears, face, lips, neck, chest, abdomen, back  and left and right upper extremities (including hands/digits and fingernails) with " the following pertinent findings listed below. Remaining above-listed examined areas within normal limits / negative for rashes or lesions.     -chest and back with scattered perifollicular pinpoint skin colored and red bumps and scattered excoriations  -ill defined white scaly patches in scalp    IMPRESSION / PLAN:    1. Miliaria rubra/crystillina  2. Pruritus  - educated patient about diagnosis, management options, and expectations of treatment  - Negative ROS  - CBC, CMP (Cr, bilirubin, LFTs), TSH/T4 reviewed and wnl  - We reviewed dry skin care in detail, including short showers or baths with luke warm water, limited use of fragrance-free soap, generous use of bland fragrance-free emollients within 3 min of exiting the shower or bath, and fragrance free laundry products.  - recommend Sarna prn itching  - May try otc Zyrtec or continue hydroxyzine at night  - Start fluocinonide 0.05% CREAM BID to affected areas of the body for 3 weeks, stop 1 week, repeat  - We reviewed risks/benefits/expectations of treatment in detail, including side effects of long term topical steroid use (such as striae, atrophy and telangiectasias).     3. Seborrheic dermatitis  - educated patient about diagnosis, management options, and expectations of treatment  - start ketoconazole 2% shampoo. Lather into scalp, let sit 5 minutes, then rinse. Use 3x per week.  - side effects of topical steroids discussed, including skin thinning, appearance of stretch marks (striae), easy bruising, and telangiectasias  - if not improved at follow up, consider adding fluocinonide 0.05% solution daily to affected areas of scalp until the scalp is smooth     Return to clinic in: Return in about 3 months (around 4/28/2020). and as needed for any new or changing skin lesions.    Agustina Avila M.D.

## 2020-04-06 ENCOUNTER — APPOINTMENT (OUTPATIENT)
Dept: SLEEP MEDICINE | Facility: MEDICAL CENTER | Age: 45
End: 2020-04-06
Payer: COMMERCIAL

## 2020-04-21 DIAGNOSIS — E66.9 OBESITY (BMI 30-39.9): ICD-10-CM

## 2020-06-11 ENCOUNTER — APPOINTMENT (OUTPATIENT)
Dept: HEALTH INFORMATION MANAGEMENT | Facility: MEDICAL CENTER | Age: 45
End: 2020-06-11
Payer: COMMERCIAL

## 2020-06-17 ENCOUNTER — HOSPITAL ENCOUNTER (OUTPATIENT)
Dept: LAB | Facility: MEDICAL CENTER | Age: 45
End: 2020-06-17
Attending: INTERNAL MEDICINE
Payer: COMMERCIAL

## 2020-06-17 ENCOUNTER — OFFICE VISIT (OUTPATIENT)
Dept: HEALTH INFORMATION MANAGEMENT | Facility: MEDICAL CENTER | Age: 45
End: 2020-06-17
Payer: COMMERCIAL

## 2020-06-17 VITALS
BODY MASS INDEX: 37.56 KG/M2 | DIASTOLIC BLOOD PRESSURE: 82 MMHG | HEIGHT: 67 IN | OXYGEN SATURATION: 98 % | HEART RATE: 65 BPM | WEIGHT: 239.3 LBS | SYSTOLIC BLOOD PRESSURE: 122 MMHG

## 2020-06-17 DIAGNOSIS — R73.01 IMPAIRED FASTING GLUCOSE: ICD-10-CM

## 2020-06-17 DIAGNOSIS — E78.1 HYPERTRIGLYCERIDEMIA: ICD-10-CM

## 2020-06-17 DIAGNOSIS — E66.9 OBESITY (BMI 30-39.9): ICD-10-CM

## 2020-06-17 DIAGNOSIS — R63.5 WEIGHT GAIN: ICD-10-CM

## 2020-06-17 DIAGNOSIS — R53.82 CHRONIC FATIGUE: ICD-10-CM

## 2020-06-17 LAB
ALBUMIN SERPL BCP-MCNC: 4.4 G/DL (ref 3.2–4.9)
ALBUMIN/GLOB SERPL: 1.8 G/DL
ALP SERPL-CCNC: 75 U/L (ref 30–99)
ALT SERPL-CCNC: 33 U/L (ref 2–50)
ANION GAP SERPL CALC-SCNC: 14 MMOL/L (ref 7–16)
AST SERPL-CCNC: 23 U/L (ref 12–45)
BILIRUB SERPL-MCNC: 0.4 MG/DL (ref 0.1–1.5)
BUN SERPL-MCNC: 12 MG/DL (ref 8–22)
CALCIUM SERPL-MCNC: 9.1 MG/DL (ref 8.5–10.5)
CHLORIDE SERPL-SCNC: 103 MMOL/L (ref 96–112)
CHOLEST SERPL-MCNC: 250 MG/DL (ref 100–199)
CO2 SERPL-SCNC: 24 MMOL/L (ref 20–33)
CREAT SERPL-MCNC: 0.81 MG/DL (ref 0.5–1.4)
FASTING STATUS PATIENT QL REPORTED: NORMAL
GLOBULIN SER CALC-MCNC: 2.4 G/DL (ref 1.9–3.5)
GLUCOSE SERPL-MCNC: 88 MG/DL (ref 65–99)
HDLC SERPL-MCNC: 50 MG/DL
LDLC SERPL CALC-MCNC: 144 MG/DL
POTASSIUM SERPL-SCNC: 4 MMOL/L (ref 3.6–5.5)
PROT SERPL-MCNC: 6.8 G/DL (ref 6–8.2)
SODIUM SERPL-SCNC: 141 MMOL/L (ref 135–145)
TRIGL SERPL-MCNC: 280 MG/DL (ref 0–149)
TSH SERPL DL<=0.005 MIU/L-ACNC: 2.14 UIU/ML (ref 0.38–5.33)

## 2020-06-17 PROCEDURE — 84443 ASSAY THYROID STIM HORMONE: CPT

## 2020-06-17 PROCEDURE — 80061 LIPID PANEL: CPT

## 2020-06-17 PROCEDURE — 83036 HEMOGLOBIN GLYCOSYLATED A1C: CPT

## 2020-06-17 PROCEDURE — 80053 COMPREHEN METABOLIC PANEL: CPT

## 2020-06-17 PROCEDURE — 93000 ELECTROCARDIOGRAM COMPLETE: CPT | Performed by: INTERNAL MEDICINE

## 2020-06-17 PROCEDURE — 36415 COLL VENOUS BLD VENIPUNCTURE: CPT

## 2020-06-17 PROCEDURE — 99205 OFFICE O/P NEW HI 60 MIN: CPT | Performed by: INTERNAL MEDICINE

## 2020-06-17 RX ORDER — METFORMIN HYDROCHLORIDE 500 MG/1
500 TABLET, EXTENDED RELEASE ORAL DAILY
Qty: 30 TAB | Refills: 1 | Status: SHIPPED | OUTPATIENT
Start: 2020-06-17 | End: 2020-07-10 | Stop reason: SDUPTHER

## 2020-06-17 ASSESSMENT — PATIENT HEALTH QUESTIONNAIRE - PHQ9: CLINICAL INTERPRETATION OF PHQ2 SCORE: 0

## 2020-06-17 ASSESSMENT — FIBROSIS 4 INDEX: FIB4 SCORE: .8838834764831844054

## 2020-06-17 NOTE — PROGRESS NOTES
"Bariatric Medicine H&P  Chief Complaint   Patient presents with   • Weight Gain       Referred by:  Jorge Lugo A.PJÚNIOR    History of Present Illness:   Nilo Muniz is a 44 y.o.  male who presents for weight management and to help address co-morbidities caused by overweight, as below.  Met patient with the assistance of a [place]  by video    Patient is concerned about his prediabetes.  His wife is a patient in the medical weight loss program, he also wants to learn how to lose weight, potentially use anti-obesity medication.  He travels most of the time, is rarely home, has to eat on the go and finds it difficult to make food choices.  He tries to prepare his own foods when he can but often has to eat fast food or gas station prepared foods.  He has not tried tracking his intake or weight loss medication in the past.    Brief Diet History (see also RD notes):  AM: Fruit, juice  Lunch: Salad or fast food  Dinner:/Salad while at home, fast food while traveling  Snacks: Packaged snacks  Drinks: Water, Coke  Skips meals often due to work schedule and sometimes works nights    I FG: Elevated A1c, not on glucose lowering agent  HLD/TG: No recent labs, not on statin or fenofibrate    Behavior-Related History:  Binge eating screen: Negative  H/o abuse: Negative     Exercise:   Climbing machine 1-2 times per week when not at work     Review of Systems   Positive for chronic fatigue, snoring, anger and irritability  Sleep apnea screen: Positive  All other ROS were reviewed with patient today and are negative.      PMH/PSH:  I have reviewed the patient's medical, social and family history, allergies, and medications today.  Prior records reviewed.  Personal Hx of Bariatric Surgery: Negative  Works in [employer], travels most of the year    Physical Exam:   /82 (BP Location: Left arm, Patient Position: Sitting, BP Cuff Size: Large adult)   Pulse 65   Ht 1.702 m (5' 7\")   Wt " 108.5 kg (239 lb 4.8 oz)   SpO2 98%   BMI 37.48 kg/m²   Waist Measurement   Waist: 45 inch/inches  Body fat % 41  REE 2118 kcal/day    Constitutional: Oriented to person, place, and time and well-developed, well-nourished, and in no distress.    HENT: No facial plethora.  No Cushingoid features.  No scalloped tongue.  No dental erosions.  No swollen parotids.  Head: Normocephalic.   Eyes: EOM are normal. Pupils are equal, round, and reactive to light. No periorbital edema.  No lateral thinning of eyebrows.  No vertical nystagmus.  Neck: Normal range of motion. Neck supple. No thyromegaly present. No buffalo hump.  Cardiovascular: Normal rate and regular rhythm.  No murmur heard.  Pulmonary/Chest: Effort normal and breath sounds normal. No wheezes.   Abdominal: Soft. Bowel sounds are normal.  Grade 1 pannus.  No ascites.  No hepatosplenomegaly.   Musculoskeletal: Normal range of motion. No edema.   Neurological: Alert and oriented to person, place, and time. Normal reflexes. No cranial nerve deficit. No muscle weakness.  Gait normal.   Skin: Warm and dry. Not diaphoretic.  No acanthosis nigricans.  Not excessively dry, scaly.  No acne.  No bruising/ecchymosis.  No hyperpigmentation.  No xanthomas or acrochordon.    Psychiatric: Mood, memory, affect and judgment normal.     Laboratory:   Prior labs reviewed.  EKG: Sinus rhythm, rate 65, early repolarization pattern, corrected QT 0.399  Ordered, performed in our office today, and reviewed by me today.    Dietitian Assessment: I have reviewed the Dietitian's assessment related to this encounter.       ASSESSMENT/PLAN:  Body mass index is 37.48 kg/m².   Obesity Stage ([place]) 1; Class 2    1. Obesity (BMI 30-39.9)  TSH WITH REFLEX TO FT4   2. Impaired fasting glucose  CMP14+LP    HEMOGLOBIN A1C   3. Hypertriglyceridemia  CMP14+LP    EKG   4. Weight gain  TSH WITH REFLEX TO FT4   5. Chronic fatigue  EKG       Given the patient's impaired fasting glucose, will focus on  a lower carb diet, in the context of his busy travel/work schedule.   Start tracking, work with dietitian to determine better food choices with travel.  Start metformin.  Consider additional anti-obesity medication.  Update baseline labs as ordered above.    The patient and I have discussed at length and agree to the following recommendations, which are all addressing the above diagnoses:    Weight Goal: 5% wt loss at one month after start (pt goal weight is 190 lb)  Diet:   Travels, eats out most meals, some night shift work  MR: Discuss at next visit  High Protein/Low Carb Meals and 2 snacks between meals daily  >100 g protein, <100 g total carbs daily, less than 2000 kcal per day to start with goal likely around 1800  Track daily intake with My Fitness Pal, bring to next visit  64+ oz water per day  Avoid excess fast food  Physical Activity: Walking for work  Consider exercise program when home from work  Risk level for moderate/vigorous exercise program:   Low  New Rx:   Start metformin 500 every afternoon  Consider additional anti-obesity medication pending course  Behavior change:   Start tracking intake, work on better food choices  Follow-up: one month with MD, 2 wks with RD  Order sleep medicine consultation to evaluate for STEPH at follow-up visit    Face to face time spent 60 minutes,  with >50% of time devoted to one on one counseling on weight management issues, as documented above.      Patient's body mass index is 37.48 kg/m². Exercise and nutrition counseling were performed at this visit.        Thank you for your referral!

## 2020-06-18 LAB
EST. AVERAGE GLUCOSE BLD GHB EST-MCNC: 123 MG/DL
HBA1C MFR BLD: 5.9 % (ref 0–5.6)

## 2020-07-15 RX ORDER — METFORMIN HYDROCHLORIDE 500 MG/1
500 TABLET, EXTENDED RELEASE ORAL DAILY
Qty: 30 TAB | Refills: 1 | Status: SHIPPED | OUTPATIENT
Start: 2020-07-15 | End: 2020-08-24 | Stop reason: SDUPTHER

## 2020-07-21 ENCOUNTER — APPOINTMENT (OUTPATIENT)
Dept: HEALTH INFORMATION MANAGEMENT | Facility: MEDICAL CENTER | Age: 45
End: 2020-07-21
Payer: COMMERCIAL

## 2020-07-29 ENCOUNTER — OFFICE VISIT (OUTPATIENT)
Dept: HEALTH INFORMATION MANAGEMENT | Facility: MEDICAL CENTER | Age: 45
End: 2020-07-29
Payer: COMMERCIAL

## 2020-07-29 VITALS
BODY MASS INDEX: 36.58 KG/M2 | DIASTOLIC BLOOD PRESSURE: 88 MMHG | HEIGHT: 67 IN | SYSTOLIC BLOOD PRESSURE: 128 MMHG | HEART RATE: 92 BPM | WEIGHT: 233.1 LBS | OXYGEN SATURATION: 95 %

## 2020-07-29 DIAGNOSIS — E78.1 HYPERTRIGLYCERIDEMIA: ICD-10-CM

## 2020-07-29 DIAGNOSIS — E66.9 OBESITY (BMI 30-39.9): ICD-10-CM

## 2020-07-29 DIAGNOSIS — R63.5 WEIGHT GAIN: ICD-10-CM

## 2020-07-29 DIAGNOSIS — Z71.3 DIETARY COUNSELING AND SURVEILLANCE: ICD-10-CM

## 2020-07-29 DIAGNOSIS — R73.01 IMPAIRED FASTING GLUCOSE: ICD-10-CM

## 2020-07-29 PROCEDURE — 99214 OFFICE O/P EST MOD 30 MIN: CPT | Mod: 25 | Performed by: INTERNAL MEDICINE

## 2020-07-29 PROCEDURE — 99401 PREV MED CNSL INDIV APPRX 15: CPT | Performed by: INTERNAL MEDICINE

## 2020-07-29 RX ORDER — PHENTERMINE HYDROCHLORIDE 37.5 MG/1
18.75 TABLET ORAL
Qty: 15 TAB | Refills: 0 | Status: SHIPPED | OUTPATIENT
Start: 2020-07-29 | End: 2020-08-24 | Stop reason: SDUPTHER

## 2020-07-29 ASSESSMENT — FIBROSIS 4 INDEX: FIB4 SCORE: 1.02

## 2020-07-29 ASSESSMENT — PATIENT HEALTH QUESTIONNAIRE - PHQ9: CLINICAL INTERPRETATION OF PHQ2 SCORE: 0

## 2020-07-29 NOTE — PROGRESS NOTES
Bariatric Medicine Follow Up  Chief Complaint   Patient presents with   • Weight Gain       History of Present Illness:   Nilo Muniz is a 45 y.o. male who presents for follow-up to intensive behavioral modification of overweight and to help address co-morbidities caused by overweight, as below.  Seen with [facility]  via video.    Obesity Stage/Class: 1/2  During the patient's last visit, the following were discussed and recommended:  Weight Goal: 5% wt loss at one month after start (pt goal weight is 190 lb)  Diet:   Travels, eats out most meals, some night shift work  MR: Discuss at next visit  High Protein/Low Carb Meals and 2 snacks between meals daily  >100 g protein, <100 g total carbs daily, less than 2000 kcal per day to start with goal likely around 1800  Track daily intake with My Fitness Pal, bring to next visit  64+ oz water per day  Avoid excess fast food  Physical Activity: Walking for work  Consider exercise program when home from work  Risk level for moderate/vigorous exercise program:   Low  New Rx:   Start metformin 500 every afternoon  Consider additional anti-obesity medication pending course  Behavior change:   Start tracking intake, work on better food choices      Challenges:  Days not working, harder to control what he is eating  Dietary adherence:  Better, more salad/lean protein  Drinking a lot more water, Gatorade zero  Exercise:  Constantly moving with work    Medication use: metformin 500 qd  He is requesting additional appetite suppressant  Medication efficacy/tolerance/side effects: pt not sure if metformin helping  Medication compliance: taking daily    Status of comorbid conditions/other medical diagnoses:  I FG: Uncontrolled, now on metformin  HLD/TG: Uncontrolled, with elevated triglycerides, not on statin or fenofibrate       Review of Systems   Pt denies lightheadedness, weakness, worsening fatigue, excessive dry mouth, mood changes,  "paresthesias.  All other ROS were reviewed and are otherwise unchanged from my previous visit with patient.    Physical Exam:    /88 (BP Location: Left arm, Patient Position: Sitting, BP Cuff Size: Large adult)   Pulse 92   Ht 1.702 m (5' 7\")   Wt 105.7 kg (233 lb 1.6 oz)   SpO2 95%   BMI 36.51 kg/m²   Waist Measurement   Waist: 45.5 inch/inches  Weight change since last visit:  -6 lb   Waist Circum change since last visit:  +0.5 in     Constitutional: Oriented to person, place, and time and well-developed, well-nourished, and in no distress.    Head: Normocephalic.   Musculoskeletal: Normal range of motion. No edema.   Neurological: Alert and oriented to person, place, and time. No muscle weakness.  Gait normal.   Skin: Warm and dry. Not diaphoretic.   Psychiatric: Mood, memory, affect and judgment normal.     Laboratory:   Recent labs reviewed.      Dietitian Assessment: I have reviewed the Dietitian's assessment related to this encounter.     ASSESSMENT  45 y.o.  male presents for intensive lifestyle intervention for treatment of obesity and co-morbid conditions.  Obesity Stage ([place]) 1; Class 2    1. Impaired fasting glucose     2. Hypertriglyceridemia     3. Weight gain     4. Obesity (BMI 30-39.9)         Metformin and carb reduction helping with wt reduction, right on track.  Pt wants to add mild additional appetite suppressant. Continue current activity level.  Continue to monitor fasting glucose, TGs.    PLAN  Weight Goal: 5% wt loss at one month after start (pt goal weight is 190 lb)  Dietary intervention:    MR:  none  High Protein/Low Carb whole food meals and 2 snacks between meals daily  Not tracking  64+ oz water per day, 8 oz Gatorade zero ok  Avoid overeating on days off  Physical Activity:  Daily with mine work  Labs:  n/a  Rx changes:   Add low dose phentermine  Continue metformin  Side Effects: Risks, benefits, alternatives discussed, consent signed.  Behavior modification:   " Mindful food choices  Surgical considerations:  n/a  Follow-up: one month with MD, weekly to biweekly for preventive obesity counseling    Patient's body mass index is 36.51 kg/m². Exercise and nutrition counseling were performed at this visit.        >>>>>>>>>>>>>      PREVENTIVE SERVICES COUNSELING FOR OBESITY NOTE    O:  Body mass index is 36.51 kg/m²., see also vitals flowsheet for today  Pt struggling with:  Stress management  Cravings  Keeping food diary/tracking consumption  Meal planning  Meal prep  Taking medications as prescribed  A:  Dietary counseling and surveillance for obesity  Z71.3, E66.9, BMI Code: Z68.36  P:  Counseled pt on reduced kcal, reduced refined CHO whole food meal plan  Advised exercise: mine work  Discussed strategies of self-monitoring with:   food diary  cognitive restructuring  stress reduction  stimulus control  Meal pre-planning  Environmental management  Time: 14 min

## 2020-08-24 ENCOUNTER — OFFICE VISIT (OUTPATIENT)
Dept: HEALTH INFORMATION MANAGEMENT | Facility: MEDICAL CENTER | Age: 45
End: 2020-08-24
Payer: COMMERCIAL

## 2020-08-24 VITALS
HEART RATE: 66 BPM | OXYGEN SATURATION: 94 % | HEIGHT: 67 IN | DIASTOLIC BLOOD PRESSURE: 76 MMHG | SYSTOLIC BLOOD PRESSURE: 126 MMHG | WEIGHT: 228.4 LBS | BODY MASS INDEX: 35.85 KG/M2

## 2020-08-24 DIAGNOSIS — E66.9 OBESITY (BMI 30-39.9): ICD-10-CM

## 2020-08-24 DIAGNOSIS — E78.1 HYPERTRIGLYCERIDEMIA: ICD-10-CM

## 2020-08-24 DIAGNOSIS — R73.01 IMPAIRED FASTING GLUCOSE: ICD-10-CM

## 2020-08-24 DIAGNOSIS — Z71.3 DIETARY COUNSELING AND SURVEILLANCE: ICD-10-CM

## 2020-08-24 PROBLEM — R63.2 BINGE EATING: Status: RESOLVED | Noted: 2020-08-24 | Resolved: 2020-08-24

## 2020-08-24 PROBLEM — R63.2 BINGE EATING: Status: ACTIVE | Noted: 2020-08-24

## 2020-08-24 PROCEDURE — 99214 OFFICE O/P EST MOD 30 MIN: CPT | Mod: 25 | Performed by: INTERNAL MEDICINE

## 2020-08-24 PROCEDURE — 99401 PREV MED CNSL INDIV APPRX 15: CPT | Performed by: INTERNAL MEDICINE

## 2020-08-24 RX ORDER — METFORMIN HYDROCHLORIDE 500 MG/1
500 TABLET, EXTENDED RELEASE ORAL DAILY
Qty: 30 TAB | Refills: 1 | Status: SHIPPED | OUTPATIENT
Start: 2020-08-24 | End: 2020-10-05

## 2020-08-24 RX ORDER — PHENTERMINE HYDROCHLORIDE 37.5 MG/1
18.75 TABLET ORAL
Qty: 15 TAB | Refills: 0 | Status: SHIPPED | OUTPATIENT
Start: 2020-08-24 | End: 2020-09-16 | Stop reason: SDUPTHER

## 2020-08-24 ASSESSMENT — FIBROSIS 4 INDEX: FIB4 SCORE: 1.02

## 2020-08-24 NOTE — PROGRESS NOTES
"Bariatric Medicine Follow Up  Chief Complaint   Patient presents with   • Weight Gain       History of Present Illness:   Nilo Muniz is a 45 y.o. male who presents for follow-up to intensive behavioral modification of overweight and to help address co-morbidities caused by overweight, as below.  Wife present.    Obesity Stage/Class: 1/2  During the patient's last visit, the following were discussed and recommended:  Weight Goal: 5% wt loss at one month after start (pt goal weight is 190 lb)  Dietary intervention:    MR:  none  High Protein/Low Carb whole food meals and 2 snacks between meals daily  Not tracking  64+ oz water per day, 8 oz Gatorade zero ok  Avoid overeating on days off  Physical Activity:  Daily with mine work  Labs:  n/a  Rx changes:   Add low dose phentermine  Continue metformin  Side Effects: Risks, benefits, alternatives discussed, consent signed.  Behavior modification:   Mindful food choices      Challenges:  Not many  Dietary adherence:  PP mj, Tuna salad L  Exercise:  Very active job    AntiObesity Medication use: phentermine 18.75 mg begin of work shift  Continues metformin  Medication efficacy/tolerance/side effects: effective, no side effects  Medication compliance: 100%    Status of comorbid conditions/other medical diagnoses:  IFG: Controlled, on metformin  HLD/TG: Uncontrolled by last labs, not on statin or fenofibrate       Review of Systems   Pt denies lightheadedness, weakness, worsening fatigue, excessive dry mouth, mood changes, paresthesias.  All other ROS were reviewed and are otherwise unchanged from my previous visit with patient.    Physical Exam:    /76 (BP Location: Right arm, Patient Position: Sitting, BP Cuff Size: Large adult)   Pulse 66   Ht 1.702 m (5' 7\")   Wt 103.6 kg (228 lb 6.4 oz)   SpO2 94%   BMI 35.77 kg/m²   Waist Measurement   Waist: 45 inch/inches  Weight change since last visit: -5 lb (-11 lb total)  Waist Circum change since last " visit: -0.5 in (0 in total)    Constitutional: Oriented to person, place, and time and well-developed, well-nourished, and in no distress.    Head: Normocephalic.   Musculoskeletal: Normal range of motion. No edema.   Neurological: Alert and oriented to person, place, and time. No muscle weakness.  Gait normal.   Skin: Warm and dry. Not diaphoretic.   Psychiatric: Mood, memory, affect and judgment normal.     Laboratory:   Recent labs reviewed.      Dietitian Assessment: I have reviewed the Dietitian's assessment related to this encounter.     ASSESSMENT  45 y.o.  male presents for intensive lifestyle intervention for treatment of obesity and co-morbid conditions.  Obesity Stage ([place]) 1; Class 2    1. Impaired fasting glucose     2. Hypertriglyceridemia     3. Obesity (BMI 30-39.9)  phentermine (ADIPEX-P) 37.5 MG tablet       Patient is doing well.  He feels he is sleeping better, much more stable with his eating patterns and happy with his progress to date.  Continue to monitor fasting glucose and lipids, which should improve with further weight loss.    PLAN  Weight Goal: 5% wt loss at one month after start (pt goal weight is 190 lb)  Dietary intervention:    MR: 1 PP every morning  High Protein/Low Carb whole food meals and 2 snacks between meals daily  Not tracking  64+ oz water per day  Avoid overeating at night during work shift, as he is doing  Physical Activity: Walking at work  Labs: Update baseline labs 10/2020  Rx changes:   Continue phentermine 18.75 mg every morning plus metformin  Side Effects: Risks, benefits, alternatives discussed, consent signed.  Behavior modification:   Consistent meal timing and stimulus narrowing  Surgical considerations: N/A  Follow-up: one month with MD, weekly to biweekly for preventive obesity counseling    Patient's body mass index is 35.77 kg/m². Exercise and nutrition counseling were performed at this visit.        >>>>>>>>>>>>>      PREVENTIVE SERVICES COUNSELING  FOR OBESITY NOTE    O:  Body mass index is 35.77 kg/m²., see also vitals flowsheet for today  Pt struggling with:  Stress management  Cravings  Keeping food diary/tracking consumption  Meal planning  Meal prep  A:  Dietary counseling and surveillance for obesity  Z71.3, E66.9, BMI Code: Z68.  35  P:  Counseled pt on reduced kcal, reduced refined CHO whole food meal plan  Advised exercise: Walking at work  Discussed strategies of self-monitoring with:   food diary  cognitive restructuring  stress reduction  stimulus control  Meal pre-planning  Environmental management  Time: 14 min

## 2020-09-16 ENCOUNTER — OFFICE VISIT (OUTPATIENT)
Dept: HEALTH INFORMATION MANAGEMENT | Facility: MEDICAL CENTER | Age: 45
End: 2020-09-16
Payer: COMMERCIAL

## 2020-09-16 VITALS
SYSTOLIC BLOOD PRESSURE: 128 MMHG | DIASTOLIC BLOOD PRESSURE: 82 MMHG | BODY MASS INDEX: 35.72 KG/M2 | WEIGHT: 227.6 LBS | HEIGHT: 67 IN | OXYGEN SATURATION: 93 % | HEART RATE: 89 BPM

## 2020-09-16 DIAGNOSIS — E66.9 OBESITY (BMI 30-39.9): ICD-10-CM

## 2020-09-16 DIAGNOSIS — Z71.3 DIETARY COUNSELING AND SURVEILLANCE: ICD-10-CM

## 2020-09-16 DIAGNOSIS — E78.1 HYPERTRIGLYCERIDEMIA: ICD-10-CM

## 2020-09-16 DIAGNOSIS — R73.01 IMPAIRED FASTING GLUCOSE: ICD-10-CM

## 2020-09-16 PROCEDURE — 99401 PREV MED CNSL INDIV APPRX 15: CPT | Performed by: INTERNAL MEDICINE

## 2020-09-16 PROCEDURE — 99214 OFFICE O/P EST MOD 30 MIN: CPT | Mod: 25 | Performed by: INTERNAL MEDICINE

## 2020-09-16 RX ORDER — PHENTERMINE HYDROCHLORIDE 37.5 MG/1
37.5 TABLET ORAL
Qty: 30 TAB | Refills: 0 | Status: SHIPPED | OUTPATIENT
Start: 2020-09-16 | End: 2020-10-16

## 2020-09-16 ASSESSMENT — PATIENT HEALTH QUESTIONNAIRE - PHQ9: CLINICAL INTERPRETATION OF PHQ2 SCORE: 0

## 2020-09-16 ASSESSMENT — FIBROSIS 4 INDEX: FIB4 SCORE: 1.02

## 2020-09-16 NOTE — PROGRESS NOTES
Bariatric Medicine Follow Up  Chief Complaint   Patient presents with   • Weight Gain       History of Present Illness:   Nilo Muniz is a 45 y.o. male who presents for follow-up to intensive behavioral modification of overweight and to help address co-morbidities caused by overweight, as below.    Obesity Stage/Class: 1/2  During the patient's last visit, the following were discussed and recommended:  Weight Goal: 5% wt loss at one month after start (pt goal weight is 190 lb)  Dietary intervention:    MR: 1 PP every morning  High Protein/Low Carb whole food meals and 2 snacks between meals daily  Not tracking  64+ oz water per day  Avoid overeating at night during work shift, as he is doing  Physical Activity: Walking at work  Labs: Update baseline labs 10/2020  Rx changes:   Continue phentermine 18.75 mg every morning plus metformin  Side Effects: Risks, benefits, alternatives discussed, consent signed.  Behavior modification:   Consistent meal timing and stimulus narrowing      Challenges: At home more, working less  Dietary adherence: PP every morning  Not tracking intake  Having more vegetables, responsible for cooking meals while home  Exercise: Trying to walk but mostly active when working and less at home    AntiObesity Medication use: Phentermine 18.75 mg every morning  Medication efficacy/tolerance/side effects: Less effective last month, no side effects  Medication compliance: Travis daily as prescribed    Status of comorbid conditions/other medical diagnoses:  I FG: Controlled, on daily metformin  HLD/TG: Controlled, not on statin or fenofibrate       Review of Systems   Pt denies lightheadedness, weakness, worsening fatigue, excessive dry mouth, mood changes, paresthesias.  All other ROS were reviewed and are otherwise unchanged from my previous visit with patient.    Physical Exam:    /82 (BP Location: Left arm, Patient Position: Sitting, BP Cuff Size: Large adult)   Pulse 89   Ht  "1.702 m (5' 7\")   Wt 103.2 kg (227 lb 9.6 oz)   SpO2 93%   BMI 35.65 kg/m²   Waist Measurement   Waist: 45 inch/inches  Weight change since last visit: -1 lb (- 11.7 lb total)  Waist Circum change since last visit: 0 in (0 in total)    Constitutional: Oriented to person, place, and time and well-developed, well-nourished, and in no distress.    Head: Normocephalic.   Musculoskeletal: Normal range of motion. No edema.   Neurological: Alert and oriented to person, place, and time. No muscle weakness.  Gait normal.   Skin: Warm and dry. Not diaphoretic.   Psychiatric: Mood, memory, affect and judgment normal.     Laboratory:   Recent labs reviewed.      Dietitian Assessment:n/a    ASSESSMENT  45 y.o.  male presents for intensive lifestyle intervention for treatment of obesity and co-morbid conditions.  Obesity Stage ([place]) 1; Class 2    1. Obesity (BMI 30-39.9)  phentermine (ADIPEX-P) 37.5 MG tablet   2. Impaired fasting glucose     3. Hypertriglyceridemia         Although the patient is responding to anti-obesity medication, his weight loss has plateaued, patient realizes when he is home more, working less he overeats and exercises less regularly.  His plan is to focus more on exercise when he is not away for work, make healthier foods at home.  Will increase anti-obesity medication dose. Continue to monitor fasting glucose, lipids.    PLAN  Weight Goal: 190 lb  Dietary intervention:    MR: 1 PP every morning  High Protein/Low Carb whole food meals and 2 snacks between meals daily  Resume tracking next month if persistent weight plateau  64+ oz water per day  Avoid excess snacking between meals  Physical Activity: Find new activity he can do while home  Labs: N/A  Rx changes:   Crease phentermine to 37.5 mg every morning, consider adding Topamax  Side Effects: Risks, benefits, alternatives discussed, consent signed.  Behavior modification:   Increase activity level significantly while home  Surgical " considerations: N/A  Follow-up: one month with MD    Patient's body mass index is 35.65 kg/m². Exercise and nutrition counseling were performed at this visit.        >>>>>>>>>>>>>      PREVENTIVE SERVICES COUNSELING FOR OBESITY NOTE    O:  Body mass index is 35.65 kg/m²., see also vitals flowsheet for today  Pt struggling with:  Stress management  Cravings  Keeping food diary/tracking consumption  Meal planning  Meal prep  Taking medications as prescribed  A:  Dietary counseling and surveillance for obesity  Z71.3, E66.9, BMI Code: Z68.  35  P:  Counseled pt on reduced kcal, reduced refined CHO whole food meal plan  Advised exercise: Cardio at home  Discussed strategies of self-monitoring with:   food diary  cognitive restructuring  stress reduction  stimulus control  Meal pre-planning  Environmental management  Time: 14 min

## 2020-10-05 RX ORDER — METFORMIN HYDROCHLORIDE 500 MG/1
TABLET, EXTENDED RELEASE ORAL
Qty: 30 TAB | Refills: 1 | Status: SHIPPED | OUTPATIENT
Start: 2020-10-05 | End: 2020-11-02

## 2020-10-05 NOTE — TELEPHONE ENCOUNTER
Received request via: Pharmacy    Was the patient seen in the last year in this department? Yes     LV   9/16/20  FV   10/26/20    Does the patient have an active prescription (recently filled or refills available) for medication(s) requested? yes

## 2020-10-23 ENCOUNTER — OFFICE VISIT (OUTPATIENT)
Dept: DERMATOLOGY | Facility: IMAGING CENTER | Age: 45
End: 2020-10-23
Payer: COMMERCIAL

## 2020-10-23 DIAGNOSIS — R21 RASH: ICD-10-CM

## 2020-10-23 DIAGNOSIS — L29.9 PRURITUS: ICD-10-CM

## 2020-10-23 PROCEDURE — 99214 OFFICE O/P EST MOD 30 MIN: CPT | Performed by: DERMATOLOGY

## 2020-10-23 RX ORDER — PHENTERMINE HYDROCHLORIDE 37.5 MG/1
37.5 TABLET ORAL
COMMUNITY
End: 2020-10-26 | Stop reason: SDUPTHER

## 2020-10-23 RX ORDER — HYDROXYZINE HYDROCHLORIDE 25 MG/1
25 TABLET, FILM COATED ORAL 3 TIMES DAILY PRN
Qty: 90 TAB | Refills: 0 | Status: SHIPPED | OUTPATIENT
Start: 2020-10-23 | End: 2020-11-19

## 2020-10-23 NOTE — PROGRESS NOTES
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  Treatments: no    Had been cleaning out [place] tank in [place].  Washed appropriately after work.  Had no break/compromise in protective equipment known after work.  Starting day/next day, noted burning of skin with subsequent itching and significant night-time itching.  Used a \"barrier cream\" and got itch relief from some baking soda home remedy.     Has had daily showers since exposure.      ROS: no fevers/chills. ++ itch.  No cough.  Denies recent viral URI sx.    DermPMH: no skin cancer/melanoma  No problem-specific Assessment & Plan notes found for this encounter.    Relevant PMH: NC.  Denies yeast/fungal infection  Social:former smoker; exposed to chemicals at work  Denies persons affected at home.    PE: Gen:WDWN male in NAD.  Skin: face/eyes/lips/neck - not affected.  Torso - back>chest with diffuse pinpoint erythematous papules.  Prominent on right compared to left side.  Arms/with notable KP-dist triceps>forearms erythematous folliculocentric appearing papules.  Legs not affected.  Scar on right knee, mildly erythematous. Isolated eczematous appearing plaque on right forearm. No vesicles/bullae noted.     A/P: suspect irritant dermatitis-sweat?/friction.  No evidence of ID reaction.  Hx of miliaria rubra/itching in past.  Low likelihood of burns without evidence of bullae/vesicles on skin.    -counseled re: dx/tx  -refills Lidex cream BID X 2-3 weeks.  Avoid sens sites  -hydroxyzine 25 mg TID, can increase to " 50mg PO QHS, if helps with sleep  -moisturizer use  -f/u 2-3 weeks/PRN    I have reviewed medications relevant to my specialty.    This was a 30-minute face-to-face visit; greater than 50% was spent counseling the patient regarding skin findings and treatments and sun protection/skin cancer detection.

## 2020-10-26 ENCOUNTER — OFFICE VISIT (OUTPATIENT)
Dept: HEALTH INFORMATION MANAGEMENT | Facility: MEDICAL CENTER | Age: 45
End: 2020-10-26
Payer: COMMERCIAL

## 2020-10-26 VITALS
SYSTOLIC BLOOD PRESSURE: 124 MMHG | DIASTOLIC BLOOD PRESSURE: 84 MMHG | RESPIRATION RATE: 16 BRPM | HEIGHT: 67 IN | OXYGEN SATURATION: 98 % | WEIGHT: 221.8 LBS | HEART RATE: 75 BPM | BODY MASS INDEX: 34.81 KG/M2

## 2020-10-26 DIAGNOSIS — E78.1 HYPERTRIGLYCERIDEMIA: ICD-10-CM

## 2020-10-26 DIAGNOSIS — R63.5 WEIGHT GAIN: ICD-10-CM

## 2020-10-26 DIAGNOSIS — R73.01 IMPAIRED FASTING GLUCOSE: ICD-10-CM

## 2020-10-26 DIAGNOSIS — E66.9 OBESITY (BMI 30-39.9): ICD-10-CM

## 2020-10-26 PROCEDURE — 99214 OFFICE O/P EST MOD 30 MIN: CPT | Performed by: INTERNAL MEDICINE

## 2020-10-26 RX ORDER — PHENTERMINE HYDROCHLORIDE 37.5 MG/1
37.5 TABLET ORAL
Qty: 30 TAB | Refills: 0 | Status: SHIPPED | OUTPATIENT
Start: 2020-10-26 | End: 2020-12-01 | Stop reason: SDUPTHER

## 2020-10-26 ASSESSMENT — FIBROSIS 4 INDEX: FIB4 SCORE: 1.02

## 2020-10-26 NOTE — PROGRESS NOTES
Bariatric Medicine Follow Up  Chief Complaint   Patient presents with   • Weight Gain       History of Present Illness:   Nilo Muniz is a 45 y.o. male who presents for follow-up to intensive behavioral modification of overweight and to help address co-morbidities caused by overweight, as below.  Wife is present.    Obesity Stage/Class: 1/2  During the patient's last visit, the following were discussed and recommended:  Weight Goal: 190 lb  Dietary intervention:    MR: 1 PP every morning  High Protein/Low Carb whole food meals and 2 snacks between meals daily  Resume tracking next month if persistent weight plateau  64+ oz water per day  Avoid excess snacking between meals  Physical Activity: Find new activity he can do while home  Labs: N/A  Rx changes:    Increase phentermine to 37.5 mg every morning, consider adding Topamax  Side Effects: Risks, benefits, alternatives discussed, consent signed.  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  All other ROS were reviewed and are otherwise unchanged from my previous visit with patient.    Physical Exam:    /84 (BP Location: Right arm, Patient Position:  "Sitting, BP Cuff Size: Large adult)   Pulse 75   Resp 16   Ht 1.702 m (5' 7\")   Wt 100.6 kg (221 lb 12.8 oz)   SpO2 98%   BMI 34.74 kg/m²   Waist Measurement   Waist: 43.25 inch/inches  Weight change since last visit: -6 lb (-17.5 lb total)  Waist Circum change since last visit: -1.75 in (-1.75 in total)    Constitutional: Oriented to person, place, and time and well-developed, well-nourished, and in no distress.    Head: Normocephalic.   Musculoskeletal: Normal range of motion. No edema.   Neurological: Alert and oriented to person, place, and time. No muscle weakness.  Gait normal.   Skin: Warm and dry. Not diaphoretic.   Psychiatric: Mood, memory, affect and judgment normal.     Laboratory:   Recent labs reviewed.      Dietitian Assessment: N/A    ASSESSMENT  45 y.o.  male presents for intensive lifestyle intervention for treatment of obesity and co-morbid conditions.  Obesity Stage ([place])/Class: 1/2    1. Weight gain     2. Hypertriglyceridemia     3. Impaired fasting glucose     4. Obesity (BMI 30-39.9)         The patient is doing well.  He continues to reverse his previous weight gain.  Fasting glucose controlled with metformin, but updated labs due next month via PCP.  Continue to monitor lipid profile.  Refined CHO reduction should improve lipid control.  Maintain higher activity level when not at work.    PLAN  Weight Goal: 190 lb  Dietary intervention:    MR: PP daily  High Protein/Low Carb whole food meals and 2 snacks between meals daily  Not tracking  64+ oz water per day  Avoid excess restaurant meals while traveling for work  Physical Activity: Increase activity when not out for work  Labs: Full panel pending next month via PCP  Rx changes:   Continue phentermine 37.5 mg every morning  Continue also metformin 500 every pm  Side Effects: Risks, benefits, alternatives discussed, consent signed.  Behavior modification:   Increase activity level when not at work  Surgical considerations: " N/A  Follow-up: one month with MD    Patient's body mass index is 34.74 kg/m². Exercise and nutrition counseling were performed at this visit.

## 2020-11-02 RX ORDER — METFORMIN HYDROCHLORIDE 500 MG/1
TABLET, EXTENDED RELEASE ORAL
Qty: 30 TAB | Refills: 1 | Status: SHIPPED | OUTPATIENT
Start: 2020-11-02 | End: 2020-12-08

## 2020-11-02 NOTE — TELEPHONE ENCOUNTER
Received request via: Pharmacy    Was the patient seen in the last year in this department? Yes    Does the patient have an active prescription (recently filled or refills available) for medication(s) requested? No     LV: 10/26/2020  FV: 12/01/2020

## 2020-11-15 DIAGNOSIS — L29.9 PRURITUS: ICD-10-CM

## 2020-11-19 RX ORDER — HYDROXYZINE HYDROCHLORIDE 25 MG/1
25 TABLET, FILM COATED ORAL 3 TIMES DAILY PRN
Qty: 90 TAB | Refills: 0 | Status: SHIPPED | OUTPATIENT
Start: 2020-11-19 | End: 2020-12-31

## 2020-11-30 ENCOUNTER — OFFICE VISIT (OUTPATIENT)
Dept: MEDICAL GROUP | Facility: MEDICAL CENTER | Age: 45
End: 2020-11-30
Payer: COMMERCIAL

## 2020-11-30 VITALS
HEART RATE: 70 BPM | TEMPERATURE: 98.2 F | SYSTOLIC BLOOD PRESSURE: 138 MMHG | HEIGHT: 67 IN | DIASTOLIC BLOOD PRESSURE: 74 MMHG | OXYGEN SATURATION: 97 % | WEIGHT: 223 LBS | RESPIRATION RATE: 16 BRPM | BODY MASS INDEX: 35 KG/M2

## 2020-11-30 DIAGNOSIS — M25.562 CHRONIC PAIN OF BOTH KNEES: ICD-10-CM

## 2020-11-30 DIAGNOSIS — G89.29 CHRONIC PAIN OF BOTH KNEES: ICD-10-CM

## 2020-11-30 DIAGNOSIS — N52.9 ERECTILE DYSFUNCTION, UNSPECIFIED ERECTILE DYSFUNCTION TYPE: ICD-10-CM

## 2020-11-30 DIAGNOSIS — E78.5 DYSLIPIDEMIA: ICD-10-CM

## 2020-11-30 DIAGNOSIS — R73.01 IMPAIRED FASTING GLUCOSE: ICD-10-CM

## 2020-11-30 DIAGNOSIS — M25.561 CHRONIC PAIN OF BOTH KNEES: ICD-10-CM

## 2020-11-30 DIAGNOSIS — Z23 NEED FOR VACCINATION: ICD-10-CM

## 2020-11-30 PROBLEM — E78.1 HYPERTRIGLYCERIDEMIA: Status: RESOLVED | Noted: 2020-06-17 | Resolved: 2020-11-30

## 2020-11-30 PROCEDURE — 90686 IIV4 VACC NO PRSV 0.5 ML IM: CPT | Performed by: NURSE PRACTITIONER

## 2020-11-30 PROCEDURE — 90471 IMMUNIZATION ADMIN: CPT | Performed by: NURSE PRACTITIONER

## 2020-11-30 PROCEDURE — 99213 OFFICE O/P EST LOW 20 MIN: CPT | Mod: 25 | Performed by: NURSE PRACTITIONER

## 2020-11-30 ASSESSMENT — FIBROSIS 4 INDEX: FIB4 SCORE: 1.02

## 2020-11-30 NOTE — PROGRESS NOTES
Subjective:      Nilo Muniz is a 45 y.o. male who presents with Knee Pain (x almost 4 months)        CC: Patient is here today for follow-up on dyslipidemia, impaired fasting glucose as well as with issues with bilateral knee pain and erectile dysfunction.    HPI       1. Dyslipidemia  Recent lab work from another office showed a lipid panel with an elevated total cholesterol at 250 and LDL of 144 with good HDL at 50 in June.  His 10-year cardiac risk assessment score however is good at 2.1%.    2. Impaired fasting glucose  Most recent hemoglobin A1c was similar to past results at 5.9.  He has been going to the weight loss clinic and feels this is been helping him to keep his blood sugars under control.    3. Chronic pain of both knees  Patient states he has been noticing increased problems with his knees bilaterally over the last few months.  Mostly bothers him when he is doing excessive walking or standing.  It has not locked up or given out on him.  He has not noticed redness or swelling.  It is getting worse and he is hoping to have some therapy or treatment to help with this.  He is not complaining of any other joint pain    4. Erectile dysfunction, unspecified erectile dysfunction type  Patient states he has been noticing more difficulty getting and maintaining an erection with his wife over the last year.  He does not think it is psychological except for the fact that he is under more stress lately.  He is wanting to have testosterone testing.  He states he is not using drugs or alcohol to excess.    5. Need for vaccination  Patient due for flu vaccine  No past medical history on file.  Social History     Socioeconomic History   • Marital status:      Spouse name: Not on file   • Number of children: Not on file   • Years of education: Not on file   • Highest education level: Not on file   Occupational History   • Not on file   Social Needs   • Financial resource strain: Not on file   •  Food insecurity     Worry: Not on file     Inability: Not on file   • Transportation needs     Medical: Not on file     Non-medical: Not on file   Tobacco Use   • Smoking status: Former Smoker     Packs/day: 0.00     Types: Cigarettes     Start date: 1/28/2019   • Smokeless tobacco: Never Used   Substance and Sexual Activity   • Alcohol use: Yes     Comment: occas   • Drug use: No   • Sexual activity: Yes     Partners: Female   Lifestyle   • Physical activity     Days per week: Not on file     Minutes per session: Not on file   • Stress: Not on file   Relationships   • Social connections     Talks on phone: Not on file     Gets together: Not on file     Attends [employer] service: Not on file     Active member of club or organization: Not on file     Attends meetings of clubs or organizations: Not on file     Relationship status: Not on file   • Intimate partner violence     Fear of current or ex partner: Not on file     Emotionally abused: Not on file     Physically abused: Not on file     Forced sexual activity: Not on file   Other Topics Concern   • Not on file   Social History Narrative   • Not on file     Current Outpatient Medications   Medication Sig Dispense Refill   • hydrOXYzine HCl (ATARAX) 25 MG Tab TAKE 1 TAB BY MOUTH 3 TIMES A DAY AS NEEDED FOR ITCHING. 90 Tab 0   • metFORMIN ER (GLUCOPHAGE XR) 500 MG TABLET SR 24 HR TAKE 1 TABLET BY MOUTH EVERY DAY 30 Tab 1   • fluocinonide (LIDEX) 0.05 % Cream Apply BID to affected areas of chest and back for 3 weeks, stop 1 week, repeat if needed.  Avoid use on face, axilla, groin. 454 g 1     No current facility-administered medications for this visit.      Family History   Problem Relation Age of Onset   • Breast Cancer Mother    • Diabetes Father    • Hyperlipidemia Father    • Heart Attack Father          Review of Systems   Musculoskeletal: Positive for joint pain.   All other systems reviewed and are negative.         Objective:     /74 (BP Location:  "Right arm, Patient Position: Sitting, BP Cuff Size: Adult)   Pulse 70   Temp 36.8 °C (98.2 °F) (Temporal)   Resp 16   Ht 1.702 m (5' 7\")   Wt 101.2 kg (223 lb)   SpO2 97%   BMI 34.93 kg/m²      Physical Exam  Vitals signs and nursing note reviewed.   Constitutional:       General: He is not in acute distress.     Appearance: He is well-developed. He is not diaphoretic.   HENT:      Head: Normocephalic and atraumatic.      Right Ear: External ear normal.      Left Ear: External ear normal.      Nose: Nose normal.   Eyes:      General:         Right eye: No discharge.         Left eye: No discharge.      Conjunctiva/sclera: Conjunctivae normal.   Neck:      Musculoskeletal: Normal range of motion and neck supple.      Thyroid: No thyromegaly.      Trachea: No tracheal deviation.   Cardiovascular:      Rate and Rhythm: Normal rate and regular rhythm.      Heart sounds: Normal heart sounds. No murmur.   Pulmonary:      Effort: Pulmonary effort is normal. No respiratory distress.      Breath sounds: Normal breath sounds. No wheezing or rales.   Musculoskeletal:      Comments: No redness or swelling of the knee joint and fairly good range of motion although there is some crepitus bilaterally.   Lymphadenopathy:      Cervical: No cervical adenopathy.   Skin:     General: Skin is warm and dry.      Findings: No erythema or rash.   Neurological:      Mental Status: He is alert and oriented to person, place, and time.      Coordination: Coordination normal.   Psychiatric:         Behavior: Behavior normal.         Thought Content: Thought content normal.         Judgment: Judgment normal.                 Assessment/Plan:        1. Dyslipidemia  Patient's total cholesterol, triglycerides and LDL are elevated but he does have good HDL and his 10-year cardiac risk assessment score is at 2.1%.  I explained that if it is over 7.5% or if he was diabetic or had active heart disease, we would want to start him on a statin.  I " told him to continue with his weight loss and dieting which will help with his cholesterol.    2. Impaired fasting glucose  Patient will continue to try to lose weight and follow a low-carb diet to prevent becoming a type II diabetic in the future and needing medication.  - Comp Metabolic Panel; Future  - HEMOGLOBIN A1C; Future    3. Chronic pain of both knees  I will have patient try physical therapy and if this does not help I will refer him to orthopedics.  - REFERRAL TO PHYSICAL THERAPY    4. Erectile dysfunction, unspecified erectile dysfunction type  I discussed with patient possible causes for erectile dysfunction and he would like testosterone testing which I will order for him to do early in the morning.  If it is low, would like to be referred to urology to discuss testosterone replacement  - Testosterone, Free & Total, Adult Male (w/SHBG); Future    5. Need for vaccination  I have placed the below orders and discussed them with an approved delegating provider. The MA is performing the below orders under the direction of Dr. Parker MD.    - Influenza Vaccine Quad Injection (PF)

## 2020-12-01 ENCOUNTER — OFFICE VISIT (OUTPATIENT)
Dept: HEALTH INFORMATION MANAGEMENT | Facility: MEDICAL CENTER | Age: 45
End: 2020-12-01
Payer: COMMERCIAL

## 2020-12-01 VITALS
HEART RATE: 79 BPM | WEIGHT: 219.7 LBS | DIASTOLIC BLOOD PRESSURE: 84 MMHG | HEIGHT: 67 IN | BODY MASS INDEX: 34.48 KG/M2 | OXYGEN SATURATION: 98 % | SYSTOLIC BLOOD PRESSURE: 128 MMHG

## 2020-12-01 DIAGNOSIS — E66.9 OBESITY (BMI 30-39.9): ICD-10-CM

## 2020-12-01 DIAGNOSIS — R63.5 WEIGHT GAIN: ICD-10-CM

## 2020-12-01 DIAGNOSIS — R73.01 IMPAIRED FASTING GLUCOSE: ICD-10-CM

## 2020-12-01 DIAGNOSIS — R61 HYPERHIDROSIS: ICD-10-CM

## 2020-12-01 DIAGNOSIS — E78.5 DYSLIPIDEMIA: ICD-10-CM

## 2020-12-01 PROCEDURE — 99214 OFFICE O/P EST MOD 30 MIN: CPT | Performed by: INTERNAL MEDICINE

## 2020-12-01 RX ORDER — PHENTERMINE HYDROCHLORIDE 37.5 MG/1
37.5 TABLET ORAL
Qty: 30 TAB | Refills: 0 | Status: SHIPPED | OUTPATIENT
Start: 2020-12-01 | End: 2021-01-05 | Stop reason: SDUPTHER

## 2020-12-01 ASSESSMENT — FIBROSIS 4 INDEX: FIB4 SCORE: 1.02

## 2020-12-01 ASSESSMENT — PATIENT HEALTH QUESTIONNAIRE - PHQ9: CLINICAL INTERPRETATION OF PHQ2 SCORE: 0

## 2020-12-01 NOTE — PROGRESS NOTES
Bariatric Medicine Follow Up  Chief Complaint   Patient presents with   • Weight Gain       History of Present Illness:   Nilo Muniz is a 45 y.o. male who presents for follow-up to intensive behavioral modification of overweight and to help address co-morbidities caused by overweight, as below.    Obesity Stage/Class: 1/2  During the patient's last visit, the following were discussed and recommended:  Weight Goal: 190 lb  Dietary intervention:    MR: PP daily  High Protein/Low Carb whole food meals and 2 snacks between meals daily  Not tracking  64+ oz water per day  Avoid excess restaurant meals while traveling for work  Physical Activity: Increase activity when not out for work  Labs: Full panel pending next month via PCP  Rx changes:   Continue phentermine 37.5 mg every morning  Continue also metformin 500 every pm  Side Effects: Risks, benefits, alternatives discussed, consent signed.  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 at home    AntiObesity Medication use: Phentermine 37.5 mg every morning plus metformin 500 mg every pm  Medication efficacy/tolerance/side effects: Effective, no side effects  Medication compliance: Taking as prescribed    Status of comorbid conditions/other medical diagnoses:  HLD: Uncontrolled but cardiac risk low per PCP, so we will not start statin at this time  IFG: Uncontrolled but working on improvement with weight loss  Hyperhidrosis: Has had for many years, not related to phentermine, wants more information about this and what he can do to treat it    Review of Systems   Pt denies lightheadedness, weakness,  "worsening fatigue, excessive dry mouth, mood changes, paresthesias.  All other ROS were reviewed and are otherwise unchanged from my previous visit with patient.    Physical Exam:    /84 (BP Location: Left arm, Patient Position: Sitting, BP Cuff Size: Large adult)   Pulse 79   Ht 1.702 m (5' 7\")   Wt 99.7 kg (219 lb 11.2 oz)   SpO2 98%   BMI 34.41 kg/m²   Waist Measurement   Waist: 42 inch/inches  Weight change since last visit: -2 lb (-20 lb total)  Waist Circum change since last visit: -1.25 in (-3 in total)    Constitutional: Oriented to person, place, and time and well-developed, well-nourished, and in no distress.    Head: Normocephalic.   Musculoskeletal: Normal range of motion. No edema.   Neurological: Alert and oriented to person, place, and time. No muscle weakness.  Gait normal.   Skin: Warm and dry. Not diaphoretic.   Psychiatric: Mood, memory, affect and judgment normal.     Laboratory:   Recent labs reviewed.      Dietitian Assessment: N/A    ASSESSMENT  45 y.o.  male presents for intensive lifestyle intervention for treatment of obesity and co-morbid conditions.  Obesity Stage ([place])/Class: 1/2    1. Weight gain     2. Dyslipidemia     3. Impaired fasting glucose     4. Obesity (BMI 30-39.9)  phentermine (ADIPEX-P) 37.5 MG tablet   5. Hyperhidrosis         The patient is doing well, continues to respond to antiobesity medication, lifestyle change including better food choices and feels much improved overall.  He continues to reverse his previous weight gain, with obesity under better control.  Continue to monitor lipids, fasting glucose which should improve with further weight loss.  Unclear why patient has hyperhidrosis but is chronic.  Handout given.  Recommended he discuss further with his primary care provider.    PLAN  Weight Goal: 190 lb  Dietary intervention:    MR:  PP once daily  High Protein/Low Carb whole food meals and 2 snacks between meals daily  Not tracking intake  64+ " oz water per day  Avoid excessive restaurant meals  Physical Activity: Start exercise program at home when not working to anticipate discontinuing phentermine, needing to increase metabolic rate  Labs: N/A  Rx changes:   Continue phentermine 37.5 mg every morning  Continue Metformin 500 mg at dinner daily  Side Effects: Risks, benefits, alternatives discussed, consent signed.  Behavior modification:   Increase activity level when not at work  Surgical considerations: N/A  Follow-up: one month with MD    Patient's body mass index is 34.41 kg/m². Exercise and nutrition counseling were performed at this visit.

## 2020-12-02 ENCOUNTER — HOSPITAL ENCOUNTER (OUTPATIENT)
Dept: LAB | Facility: MEDICAL CENTER | Age: 45
End: 2020-12-02
Attending: NURSE PRACTITIONER
Payer: COMMERCIAL

## 2020-12-02 DIAGNOSIS — R73.01 IMPAIRED FASTING GLUCOSE: ICD-10-CM

## 2020-12-02 DIAGNOSIS — N52.9 ERECTILE DYSFUNCTION, UNSPECIFIED ERECTILE DYSFUNCTION TYPE: ICD-10-CM

## 2020-12-02 LAB
ALBUMIN SERPL BCP-MCNC: 4.2 G/DL (ref 3.2–4.9)
ALBUMIN/GLOB SERPL: 1.6 G/DL
ALP SERPL-CCNC: 74 U/L (ref 30–99)
ALT SERPL-CCNC: 21 U/L (ref 2–50)
ANION GAP SERPL CALC-SCNC: 9 MMOL/L (ref 7–16)
AST SERPL-CCNC: 13 U/L (ref 12–45)
BILIRUB SERPL-MCNC: 0.4 MG/DL (ref 0.1–1.5)
BUN SERPL-MCNC: 16 MG/DL (ref 8–22)
CALCIUM SERPL-MCNC: 9.4 MG/DL (ref 8.5–10.5)
CHLORIDE SERPL-SCNC: 105 MMOL/L (ref 96–112)
CO2 SERPL-SCNC: 26 MMOL/L (ref 20–33)
CREAT SERPL-MCNC: 0.74 MG/DL (ref 0.5–1.4)
EST. AVERAGE GLUCOSE BLD GHB EST-MCNC: 117 MG/DL
GLOBULIN SER CALC-MCNC: 2.6 G/DL (ref 1.9–3.5)
GLUCOSE SERPL-MCNC: 113 MG/DL (ref 65–99)
HBA1C MFR BLD: 5.7 % (ref 0–5.6)
POTASSIUM SERPL-SCNC: 4.7 MMOL/L (ref 3.6–5.5)
PROT SERPL-MCNC: 6.8 G/DL (ref 6–8.2)
SODIUM SERPL-SCNC: 140 MMOL/L (ref 135–145)

## 2020-12-02 PROCEDURE — 84402 ASSAY OF FREE TESTOSTERONE: CPT

## 2020-12-02 PROCEDURE — 84270 ASSAY OF SEX HORMONE GLOBUL: CPT

## 2020-12-02 PROCEDURE — 36415 COLL VENOUS BLD VENIPUNCTURE: CPT

## 2020-12-02 PROCEDURE — 83036 HEMOGLOBIN GLYCOSYLATED A1C: CPT

## 2020-12-02 PROCEDURE — 84403 ASSAY OF TOTAL TESTOSTERONE: CPT

## 2020-12-02 PROCEDURE — 80053 COMPREHEN METABOLIC PANEL: CPT

## 2020-12-04 DIAGNOSIS — R79.89 LOW TESTOSTERONE IN MALE: ICD-10-CM

## 2020-12-04 LAB
SHBG SERPL-SCNC: 24 NMOL/L (ref 11–80)
TESTOST FREE MFR SERPL: 2.1 % (ref 1.6–2.9)
TESTOST FREE SERPL-MCNC: 59 PG/ML (ref 47–244)
TESTOST SERPL-MCNC: 283 NG/DL (ref 300–890)

## 2020-12-08 RX ORDER — METFORMIN HYDROCHLORIDE 500 MG/1
TABLET, EXTENDED RELEASE ORAL
Qty: 30 TAB | Refills: 1 | Status: SHIPPED | OUTPATIENT
Start: 2020-12-08 | End: 2021-01-04

## 2020-12-08 NOTE — TELEPHONE ENCOUNTER
Received request via: Pharmacy    Was the patient seen in the last year in this department? Yes     [facility]    12/1/20  [facility]    1/5/20      Does the patient have an active prescription (recently filled or refills available) for medication(s) requested? yes

## 2020-12-09 ENCOUNTER — APPOINTMENT (OUTPATIENT)
Dept: PHYSICAL THERAPY | Facility: REHABILITATION | Age: 45
End: 2020-12-09
Attending: NURSE PRACTITIONER
Payer: COMMERCIAL

## 2020-12-24 DIAGNOSIS — L29.9 PRURITUS: ICD-10-CM

## 2020-12-28 NOTE — TELEPHONE ENCOUNTER
Received request via: Pharmacy    Was the patient seen in the last year in this department? Yes    Does the patient have an active prescription (recently filled or refills available) for medication(s) requested?

## 2020-12-30 ENCOUNTER — APPOINTMENT (OUTPATIENT)
Dept: PHYSICAL THERAPY | Facility: REHABILITATION | Age: 45
End: 2020-12-30
Attending: NURSE PRACTITIONER
Payer: COMMERCIAL

## 2020-12-31 RX ORDER — HYDROXYZINE HYDROCHLORIDE 25 MG/1
25 TABLET, FILM COATED ORAL 3 TIMES DAILY PRN
Qty: 90 TAB | Refills: 0 | Status: SHIPPED | OUTPATIENT
Start: 2020-12-31 | End: 2022-03-22

## 2021-01-04 RX ORDER — METFORMIN HYDROCHLORIDE 500 MG/1
TABLET, EXTENDED RELEASE ORAL
Qty: 30 TAB | Refills: 1 | Status: SHIPPED | OUTPATIENT
Start: 2021-01-04 | End: 2021-01-29

## 2021-01-04 NOTE — TELEPHONE ENCOUNTER
Received request via: Pharmacy    Was the patient seen in the last year in this department?yes    [facility]    12/1/20  [facility]    1/8/21      Does the patient have an active prescription (recently filled or refills available) for medication(s) requested? yes

## 2021-01-05 ENCOUNTER — OFFICE VISIT (OUTPATIENT)
Dept: HEALTH INFORMATION MANAGEMENT | Facility: MEDICAL CENTER | Age: 46
End: 2021-01-05
Payer: COMMERCIAL

## 2021-01-05 VITALS
BODY MASS INDEX: 33.97 KG/M2 | OXYGEN SATURATION: 98 % | WEIGHT: 216.4 LBS | SYSTOLIC BLOOD PRESSURE: 130 MMHG | HEART RATE: 82 BPM | DIASTOLIC BLOOD PRESSURE: 82 MMHG | HEIGHT: 67 IN

## 2021-01-05 DIAGNOSIS — R63.5 WEIGHT GAIN: ICD-10-CM

## 2021-01-05 DIAGNOSIS — E66.9 OBESITY (BMI 30-39.9): ICD-10-CM

## 2021-01-05 DIAGNOSIS — R73.01 IMPAIRED FASTING GLUCOSE: ICD-10-CM

## 2021-01-05 DIAGNOSIS — E78.5 DYSLIPIDEMIA: ICD-10-CM

## 2021-01-05 PROCEDURE — 99214 OFFICE O/P EST MOD 30 MIN: CPT | Performed by: INTERNAL MEDICINE

## 2021-01-05 RX ORDER — PHENTERMINE HYDROCHLORIDE 37.5 MG/1
37.5 TABLET ORAL
Qty: 30 TAB | Refills: 0 | Status: SHIPPED | OUTPATIENT
Start: 2021-01-05 | End: 2021-02-16 | Stop reason: SDUPTHER

## 2021-01-05 ASSESSMENT — FIBROSIS 4 INDEX: FIB4 SCORE: 0.73

## 2021-01-05 ASSESSMENT — PATIENT HEALTH QUESTIONNAIRE - PHQ9: CLINICAL INTERPRETATION OF PHQ2 SCORE: 0

## 2021-01-05 NOTE — PROGRESS NOTES
Bariatric Medicine Follow Up  Chief Complaint   Patient presents with   • Weight Gain   • Obesity       History of Present Illness:   Nilo Muniz is a 45 y.o. male who presents for follow-up to intensive behavioral modification of overweight and to help address co-morbidities caused by overweight, as below.    Obesity Stage/Class: 1/2  During the patient's last visit, the following were discussed and recommended:  Weight Goal: 190 lb  Dietary intervention:    MR:  PP once daily  High Protein/Low Carb whole food meals and 2 snacks between meals daily  Not tracking intake  64+ oz water per day  Avoid excessive restaurant meals  Physical Activity: Start exercise program at home when not working to anticipate discontinuing phentermine, needing to increase metabolic rate  Labs: N/A  Rx changes:   Continue phentermine 37.5 mg every morning  Continue Metformin 500 mg at dinner daily  Side Effects: Risks, benefits, alternatives discussed, consent signed.  Behavior modification:   Increase activity level when not at work    ___    Challenges: Travel, no access to decent food when at work out of state  Dietary adherence: Overall fair  Watching portion sizes  Unable to use meal replacements or healthy fruits and vegetables  Sometimes can only get food from a gas station or fast food while away for work  Not tracking intake  Eating healthier when home, 1 PP daily  Exercise: Constant physical work but at home household work    AntiObesity Medication use: Phentermine 37.5 mg every morning plus metformin 500 every dinner  Medication efficacy/tolerance/side effects: Effective, no side effects  Medication compliance: Taking as prescribed    Status of comorbid conditions/other medical diagnoses:  IFG: Controlled with metformin  HLD: Controlled, not on statin or fenofibrate       Review of Systems   Pt denies lightheadedness, weakness, worsening fatigue, excessive dry mouth, mood changes, paresthesias.  All other ROS  "were reviewed and are otherwise unchanged from my previous visit with patient.    Physical Exam:    /82 (BP Location: Left arm, Patient Position: Sitting, BP Cuff Size: Large adult)   Pulse 82   Ht 1.702 m (5' 7\")   Wt 98.2 kg (216 lb 6.4 oz)   SpO2 98%   BMI 33.89 kg/m²   Waist Measurement   Waist: 43 inch/inches  Weight change since last visit: -3.3 lb (-23 lb total)  Waist Circum change since last visit: +1 in (-2 in total)    Constitutional: Oriented to person, place, and time and well-developed, well-nourished, and in no distress.    Head: Normocephalic.   Musculoskeletal: Normal range of motion. No edema.   Neurological: Alert and oriented to person, place, and time. No muscle weakness.  Gait normal.   Skin: Warm and dry. Not diaphoretic.   Psychiatric: Mood, memory, affect and judgment normal.     Laboratory:   Recent labs reviewed.      Dietitian Assessment: N/A    ASSESSMENT  45 y.o.  male presents for intensive lifestyle intervention for treatment of obesity and co-morbid conditions.  Obesity Stage ([place])/Class: 1/2    1. Weight gain     2. Impaired fasting glucose     3. Dyslipidemia     4. Obesity (BMI 30-39.9)  phentermine (ADIPEX-P) 37.5 MG tablet       The patient is doing well, continues to reverse his previous weight gain and respond to antiobesity medication.  Fasting glucose well controlled with Metformin.  Continue to monitor lipids off statin but should be improving with lifestyle change.  Increase activity while not at work.    PLAN  Weight Goal: 190 lb  Dietary intervention:    MR: PP once daily  High Protein/Low Carb whole food meals and 2 snacks between meals daily  Not tracking intake  64+ oz water per day  Avoid excessive fast food meals while traveling for work  Physical Activity: More walking while home from work  Labs: N/A  Rx changes:   Continue phentermine 37.5 mg every morning plus metformin 500 mg at dinner  Side Effects: Risks, benefits, alternatives discussed, " consent signed.  Behavior modification:   Gradually increase activity when home from work  Surgical considerations: N/A  Follow-up: one month with MD    Patient's body mass index is 33.89 kg/m². Exercise and nutrition counseling were performed at this visit.

## 2021-01-07 NOTE — OP THERAPY EVALUATION
Outpatient Physical Therapy  INITIAL EVALUATION    [facility] [address], [address]  [place] [address]  Phone:  [phone]    Date of Evaluation: 2021    Patient: Nilo Muniz  YOB: 1975  MRN: [id number]     Referring Provider: RANDY Perez  [address]  [address]  [place] [address]   Referring Diagnosis Chronic pain of both knees [M25.561, M25.562, G89.29]     Time Calculation    Start time: 0800  Stop time: 0900 Time Calculation (min): 60 minutes         Chief Complaint: Knee Problem and Hip Problem    Visit Diagnoses     ICD-10-CM   1. Chronic pain of both knees  M25.561    M25.562    G89.29         Subjective:   History of Present Illness:     Date of onset:  2020    Mechanism of injury:  The patient is a 45 year old male who reports bilateral knee pain. The patient has noticed pain for the last ~6 months. The patient denies surgery or trauma in the past. When is working on his feet and has to use the stairs. 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 He does wear knee pads for work on the floors of buildings.  Quality of life:  Good  Headaches:  no headaches  Ear problems: none  Sleep disturbance:  Not disrupted  Pain:     Current pain ratin    At best pain ratin    At worst pain ratin    Quality:  Dull ache    Pain timing:  Constant, continuously and sporadic    Relieving factors:  Heat application, pain medication and sitting down    Aggravating factors:  Kneeling, stair climbing, standing, squatting, prolonged sitting and walking  Social Support:     Lives in:  Multiple-level home    Lives with:  Alone  Hand dominance:  Right  Patient Goals:     Other patient goals:  To decrease pain and increase mobility; to have less pain working       No past medical history on file.  Past Surgical History:   Procedure Laterality Date   • APPENDECTOMY       Social History     Tobacco Use   •  Smoking status: Former Smoker     Packs/day: 0.00     Types: Cigarettes     Start date: 1/28/2019   • Smokeless tobacco: Never Used   Substance Use Topics   • Alcohol use: Yes     Comment: occas     Family and Occupational History     Socioeconomic History   • Marital status:      Spouse name: Not on file   • Number of children: Not on file   • Years of education: Not on file   • Highest education level: Not on file   Occupational History   • Not on file       Objective     Observations     Additional Observation Details  Bilateral knee valgus deformity     Tenderness   Left Knee   Tenderness in the lateral joint line and medial joint line.     Right Knee   Tenderness in the lateral joint line and medial joint line.     Active Range of Motion   Left Knee   Flexion: 135 degrees   Prone flexion: 130 degrees   Extension: 0 degrees     Right Knee   Flexion: 125 degrees with pain  Prone flexion: 120 degrees with pain  Extension: 0 degrees     Patellar Mobility   Left Knee Hypomobile in the left lateral patellar tendon(s).     Right Knee Hypomobile in the lateral patellar tendon(s).     Strength:      Left Knee   Flexion: 5  Prone flexion: 4+  Extension: 4+    Right Knee   Flexion: 4+  Prone flexion: 4+  Extension: 5    Tests     Left Knee   Positive patellar apprehension.   Negative lateral Ryley, medial Ryley and valgus stress test at 30 degrees.     Right Knee   Positive patellar apprehension.   Negative lateral Ryley, medial Ryley and valgus stress test at 30 degrees.   Ambulation     Observational Gait   Walking speed, stride length, left stance time, right stance time, left swing time, right swing time, left step length and right step length within functional limits. Stride width: WFL.    Left foot contact pattern: heel to toe  Right foot contact pattern: heel to toe    Functional Assessment   Squat   Pain.     Single Leg Squat   Left Leg  No pain.     Right Leg  Pain.     Single Leg Stance  "  Left: 10 seconds  Right: 10 seconds        Therapeutic Exercises (CPT 65284):     1. Upright bike     2. Hamstring stretch, 3 x30 sec    3. Quad stretch, 3 x30 sec    4. Wall squats, 10x 2    5. Leg extension (green tb), 10x 2    6. Leg curls  (green tb), 10x 2    7. Steps 4-6\"     8. Side planks     9. Side steps    10. Stationary lunge    11. Side leg bridge    12. Foam roller      Time-based treatments/modalities:           Assessment, Response and Plan:   Impairments: activity intolerance, lacks appropriate home exercise program and pain with function    Assessment details:  The patient is a 45 year old male who reports bilateral knee pain for ~ 6 months. 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 He has pain with kneeling and experienced a \"locking\" or \"catch\" to the (R) knee while trying to stand back up. Observed to have bilateral knee valgus positioning, gait pattern with toeing in gait and tightness in TFL. AROM at the (R) knee flexion limited in prone ~120 deg with pain. Hypomobile bilaterally laterally at patella. Special testing (+) patellar apprehension bilaterally. Patient would benefit from skilled physical therapy to address patella femoral pain and work on strength and conditioning, AROM/PROM, activity tolerance and functional training activity.   Barriers to therapy:  None  Prognosis: good    Goals:   Short Term Goals:   1) Indep with HEP   2) Able to kneel and stand with 25-50% less pain in knees  3) Increase strength in (L) quads by 1 MM grade  Short term goal time span:  2-4 weeks      Long Term Goals:    1) Progression/regression advancing HEP  2) Negotiates steps reciprically without pain through work day 50% or more  3) Improved functional movement during squatting motion with less pain by 1-2 levels on VAS  Long term goal time span:  4-6 weeks    Plan:   Therapy options:  Physical therapy treatment to continue  Planned therapy interventions:  E Stim Unattended " (CPT 93025), Functional Training, Self Care (CPT 06373), Gait Training (CPT 54366), Manual Therapy (CPT 17321), Neuromuscular Re-education (CPT 71929), Self Care ADL Training (CPT 96626), Therapeutic Activities (CPT 14153) and Therapeutic Exercise (CPT 00911)  Frequency:  2x week  Duration in weeks:  6  Duration in visits:  12  Discussed with:  Patient      Functional Assessment Used        Referring provider co-signature:  I have reviewed this plan of care and my co-signature certifies the need for services.    Certification Period: 01/08/2021 to  02/19/21    Physician Signature: ________________________________ Date: ______________

## 2021-01-08 ENCOUNTER — PHYSICAL THERAPY (OUTPATIENT)
Dept: PHYSICAL THERAPY | Facility: REHABILITATION | Age: 46
End: 2021-01-08
Attending: NURSE PRACTITIONER
Payer: COMMERCIAL

## 2021-01-08 DIAGNOSIS — G89.29 CHRONIC PAIN OF BOTH KNEES: ICD-10-CM

## 2021-01-08 DIAGNOSIS — M25.561 CHRONIC PAIN OF BOTH KNEES: ICD-10-CM

## 2021-01-08 DIAGNOSIS — M25.562 CHRONIC PAIN OF BOTH KNEES: ICD-10-CM

## 2021-01-08 PROCEDURE — 97162 PT EVAL MOD COMPLEX 30 MIN: CPT

## 2021-01-08 SDOH — ECONOMIC STABILITY: GENERAL: QUALITY OF LIFE: GOOD

## 2021-01-08 ASSESSMENT — ENCOUNTER SYMPTOMS
ALLEVIATING FACTORS: PAIN MEDICATION
EXACERBATED BY: SQUATTING
PAIN TIMING: SPORADIC
ALLEVIATING FACTORS: SITTING DOWN
PAIN TIMING: CONSTANT
ALLEVIATING FACTORS: HEAT APPLICATION
PAIN SCALE AT HIGHEST: 7
EXACERBATED BY: KNEELING
EXACERBATED BY: PROLONGED SITTING
PAIN SCALE AT LOWEST: 0
EXACERBATED BY: STAIR CLIMBING
PAIN TIMING: CONTINUOUSLY
PAIN SCALE: 2
EXACERBATED BY: STANDING
QUALITY: DULL ACHE
EXACERBATED BY: WALKING

## 2021-01-11 NOTE — OP THERAPY DAILY TREATMENT
"  Outpatient Physical Therapy  DAILY TREATMENT     [facility] Outpatient Physical Therapy [address] [facility], [address]  [place] [address]  Phone:  [phone]    Date: 01/12/2021    Patient: Nilo Muniz  YOB: 1975  MRN: [id number]     Time Calculation    Start time: 1000  Stop time: 1030 Time Calculation (min): 30 minutes         Chief Complaint: Knee Problem    Visit #: 2    SUBJECTIVE:  The patient reports some stiffness and soreness in both knees this morning but after getting up and moving less symptoms. When he sits longer he still has some discomfort.    OBJECTIVE:  Current objective measures:       Increase stability and strength quads, glutes and hamstrings    Therapeutic Exercises (CPT 58142):     1. Upright bike   L3, 5 minutes    2. Hamstring stretch, 3 x30 sec, HEP    3. Quad stretch, 3 x30 sec, HEP    4. Wall squats, 10x 2    5. Leg extension (green tb), 10x 2    6. Leg curls  (green tb), 10x 2    7. Steps 7\" fwd , 10x each    8. Side planks     9. Side steps, 10x each    10. Stationary lunge    11. Side leg bridge    12. Kneeling to standing over AirEx foam, 10x each , 1x had (R) knee pain getting up to standing position      Time-based treatments/modalities:    Physical Therapy Timed Treatment Charges  Neuromusc re-ed, balance, coor, post minutes (CPT 44532): 15 minutes  Therapeutic exercise minutes (CPT 05004): 15 minutes      ASSESSMENT:   Response to treatment:   Performed stability and balance tasks over 7\" step with static and dynamic balance; good motor control. Patient experienced (R) knee pain while kneeling and getting up to standing position    PLAN/RECOMMENDATIONS:   Plan for treatment: therapy treatment to continue next visit.  Planned interventions for next visit: continue with current treatment.       "

## 2021-01-12 ENCOUNTER — PHYSICAL THERAPY (OUTPATIENT)
Dept: PHYSICAL THERAPY | Facility: REHABILITATION | Age: 46
End: 2021-01-12
Attending: NURSE PRACTITIONER
Payer: COMMERCIAL

## 2021-01-12 DIAGNOSIS — M25.562 CHRONIC PAIN OF BOTH KNEES: ICD-10-CM

## 2021-01-12 DIAGNOSIS — M25.561 CHRONIC PAIN OF BOTH KNEES: ICD-10-CM

## 2021-01-12 DIAGNOSIS — G89.29 CHRONIC PAIN OF BOTH KNEES: ICD-10-CM

## 2021-01-12 PROCEDURE — 97112 NEUROMUSCULAR REEDUCATION: CPT

## 2021-01-12 PROCEDURE — 97110 THERAPEUTIC EXERCISES: CPT

## 2021-01-19 ENCOUNTER — PHYSICAL THERAPY (OUTPATIENT)
Dept: PHYSICAL THERAPY | Facility: REHABILITATION | Age: 46
End: 2021-01-19
Attending: NURSE PRACTITIONER
Payer: COMMERCIAL

## 2021-01-19 DIAGNOSIS — M25.562 CHRONIC PAIN OF BOTH KNEES: ICD-10-CM

## 2021-01-19 DIAGNOSIS — M25.561 CHRONIC PAIN OF BOTH KNEES: ICD-10-CM

## 2021-01-19 DIAGNOSIS — G89.29 CHRONIC PAIN OF BOTH KNEES: ICD-10-CM

## 2021-01-19 PROCEDURE — 97110 THERAPEUTIC EXERCISES: CPT

## 2021-01-19 PROCEDURE — 97112 NEUROMUSCULAR REEDUCATION: CPT

## 2021-01-19 NOTE — OP THERAPY DAILY TREATMENT
"  Outpatient Physical Therapy  DAILY TREATMENT     [facility] Outpatient Physical Therapy [address], [address]  [place] [address]  Phone:  [phone]    Date: 01/19/2021    Patient: Nilo Muniz  YOB: 1975  MRN: [id number]     Time Calculation    Start time: 0800  Stop time: 0830 Time Calculation (min): 30 minutes         Chief Complaint: Knee Problem    Visit #: 3    SUBJECTIVE:  The patient reports having pain to both his knees this morning. The patient uses ice to his knees after work.    OBJECTIVE:  Current objective measures:       Increase strength to the quadriceps; improve trac    Therapeutic Exercises (CPT 95455):     1. Upright bike (seat level 7)  L3, 5 minutes    2. Hamstring stretch, HEP    3. Quad stretch, HEP    4. Wall squats, 10x 3    5. Leg extension (green tb), 10x 2    6. Leg curls  (green tb), 10x 2    7. Steps 7\" fwd , 10x each    8. Side planks     9. Side steps, 10x each    10. Stationary lunge    11. Side leg bridge    12. Kneeling to standing over AirEx foam, NT    13. Clamshells (green tb), 10x each side    Therapeutic Treatments and Modalities:     1. Manual Therapy (CPT 87730), patella mobs, bilateral mobs to patella in all directions, bilateral IR/ER at tibiofemoral joint    Therapeutic Treatment and Modalities Summary: Next visit; taping patella    Time-based treatments/modalities:    Physical Therapy Timed Treatment Charges  Manual therapy minutes (CPT 21115): 10 minutes  Neuromusc re-ed, balance, coor, post minutes (CPT 01947): 10 minutes  Therapeutic exercise minutes (CPT 38599): 10 minutes      ASSESSMENT:   Response to treatment:   Performed forward and lateral steps with occasional pain to the (L) knee during task; progression with frequency of step drills in repetitions completed. Next visit; taping patella    PLAN/RECOMMENDATIONS:   Plan for treatment: therapy treatment to continue next visit.  Planned interventions for next visit: continue " with current treatment.

## 2021-01-22 ENCOUNTER — HOSPITAL ENCOUNTER (OUTPATIENT)
Dept: LAB | Facility: MEDICAL CENTER | Age: 46
End: 2021-01-22
Attending: PHYSICIAN ASSISTANT
Payer: COMMERCIAL

## 2021-01-22 LAB
ESTRADIOL SERPL-MCNC: 6.5 PG/ML
FSH SERPL-ACNC: 4.8 MIU/ML (ref 1.5–12.4)
LH SERPL-ACNC: 2.7 IU/L (ref 1.7–8.6)
PROLACTIN SERPL-MCNC: 5.56 NG/ML (ref 2.1–17.7)
PSA SERPL-MCNC: 3.66 NG/ML (ref 0–4)
TESTOST SERPL-MCNC: 233 NG/DL (ref 175–781)

## 2021-01-22 PROCEDURE — 83001 ASSAY OF GONADOTROPIN (FSH): CPT

## 2021-01-22 PROCEDURE — 84153 ASSAY OF PSA TOTAL: CPT

## 2021-01-22 PROCEDURE — 84146 ASSAY OF PROLACTIN: CPT

## 2021-01-22 PROCEDURE — 83002 ASSAY OF GONADOTROPIN (LH): CPT

## 2021-01-22 PROCEDURE — 84403 ASSAY OF TOTAL TESTOSTERONE: CPT

## 2021-01-22 PROCEDURE — 82670 ASSAY OF TOTAL ESTRADIOL: CPT

## 2021-01-22 PROCEDURE — 36415 COLL VENOUS BLD VENIPUNCTURE: CPT

## 2021-01-27 ENCOUNTER — PHYSICAL THERAPY (OUTPATIENT)
Dept: PHYSICAL THERAPY | Facility: REHABILITATION | Age: 46
End: 2021-01-27
Attending: NURSE PRACTITIONER
Payer: COMMERCIAL

## 2021-01-27 DIAGNOSIS — M25.562 CHRONIC PAIN OF BOTH KNEES: ICD-10-CM

## 2021-01-27 DIAGNOSIS — M25.561 CHRONIC PAIN OF BOTH KNEES: ICD-10-CM

## 2021-01-27 DIAGNOSIS — G89.29 CHRONIC PAIN OF BOTH KNEES: ICD-10-CM

## 2021-01-27 PROCEDURE — 97140 MANUAL THERAPY 1/> REGIONS: CPT

## 2021-01-27 PROCEDURE — 97110 THERAPEUTIC EXERCISES: CPT

## 2021-01-27 NOTE — OP THERAPY DAILY TREATMENT
"  Outpatient Physical Therapy  DAILY TREATMENT     [facility] Physical Therapy [address], [address]  [place] [address]  Phone:  [phone]    Date: 01/27/2021    Patient: Nilo Muniz  YOB: 1975  MRN: [id number]     Time Calculation    Start time: 0830  Stop time: 0900 Time Calculation (min): 30 minutes         Chief Complaint: Knee Problem    Visit #: 4    SUBJECTIVE:  The patient reports some pain after activity or exercises. He says as long as he keeps moving his pain is not present.    OBJECTIVE:  Current objective measures:     apply kinesio-taping to bilateral patella for increased support      Therapeutic Exercises (CPT 88348):     1. Upright bike (seat level 7)  L3, 5 minutes    4. Wall squats, 10x 3    5. Leg extension (green tb), 10x 2    6. Leg curls  (green tb), 10x 2    7. Steps 7\" fwd & lateral, 10x each    8. Leg press, 5B at 25 reps    10. Stationary lunge    11. Side leg bridge    12. Kneeling to standing over AirEx foam, 20x    13. Clamshells (green tb), 10x each side    Therapeutic Treatments and Modalities:     1. Manual Therapy (CPT 71798), patella mobs, bilateral mobs to patella in all directions, bilateral IR/ER at tibiofemoral joint    Therapeutic Treatment and Modalities Summary: Next visit; taping patella    Time-based treatments/modalities:    Physical Therapy Timed Treatment Charges  Manual therapy minutes (CPT 57235): 15 minutes  Therapeutic exercise minutes (CPT 05186): 15 minutes      ASSESSMENT:   Response to treatment:   Manual therapy techniques; applied pre-tape and kinesio-taping for increased support of patellar tracking bilaterally; patient performed squatting, stepping and kneeling activity with improved support during functional activity. Tolerated well without increased pain.    PLAN/RECOMMENDATIONS:   Plan for treatment: continue with current treatment   Planned interventions for next visit: continue with current treatment.     "

## 2021-01-28 NOTE — TELEPHONE ENCOUNTER
Received request via: Pharmacy    Was the patient seen in the last year in this department? Yes     LV 01/05/21  FV 2/16/21    Does the patient have an active prescription (recently filled or refills available) for medication(s) requested? Yes

## 2021-01-29 RX ORDER — METFORMIN HYDROCHLORIDE 500 MG/1
TABLET, EXTENDED RELEASE ORAL
Qty: 30 TAB | Refills: 1 | Status: SHIPPED | OUTPATIENT
Start: 2021-01-29 | End: 2021-02-24

## 2021-02-16 ENCOUNTER — OFFICE VISIT (OUTPATIENT)
Dept: HEALTH INFORMATION MANAGEMENT | Facility: MEDICAL CENTER | Age: 46
End: 2021-02-16
Payer: COMMERCIAL

## 2021-02-16 VITALS
WEIGHT: 224.4 LBS | HEART RATE: 88 BPM | SYSTOLIC BLOOD PRESSURE: 124 MMHG | OXYGEN SATURATION: 95 % | HEIGHT: 67 IN | BODY MASS INDEX: 35.22 KG/M2 | DIASTOLIC BLOOD PRESSURE: 82 MMHG

## 2021-02-16 DIAGNOSIS — E78.5 DYSLIPIDEMIA: ICD-10-CM

## 2021-02-16 DIAGNOSIS — E66.9 OBESITY (BMI 30-39.9): ICD-10-CM

## 2021-02-16 DIAGNOSIS — R63.5 WEIGHT GAIN: ICD-10-CM

## 2021-02-16 DIAGNOSIS — R73.01 IMPAIRED FASTING GLUCOSE: ICD-10-CM

## 2021-02-16 PROCEDURE — 99214 OFFICE O/P EST MOD 30 MIN: CPT | Performed by: INTERNAL MEDICINE

## 2021-02-16 RX ORDER — PHENTERMINE HYDROCHLORIDE 37.5 MG/1
37.5 TABLET ORAL
Qty: 30 TABLET | Refills: 0 | Status: SHIPPED | OUTPATIENT
Start: 2021-02-16 | End: 2021-03-29 | Stop reason: SDUPTHER

## 2021-02-16 ASSESSMENT — FIBROSIS 4 INDEX: FIB4 SCORE: 0.73

## 2021-02-16 ASSESSMENT — PATIENT HEALTH QUESTIONNAIRE - PHQ9: CLINICAL INTERPRETATION OF PHQ2 SCORE: 0

## 2021-02-16 NOTE — PROGRESS NOTES
"Bariatric Medicine Follow Up  Chief Complaint   Patient presents with   • Obesity   • Weight Gain       History of Present Illness:   Nilo Muniz is a 45 y.o. male who presents for follow-up to intensive behavioral modification of overweight and to help address below co-morbidities caused by overweight.    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  Not tracking his intake  Eating healthier when he is home from being out of town for work  Exercise: Physically very active during 12-hour work shifts    AntiObesity Medication use: Phentermine 37.5 mg  Metformin 500 mg  Medication efficacy/tolerance/side effects: Effective, no side effects  Medication compliance: As prescribed    Status of comorbid conditions/other medical diagnoses:  IFG: Controlled with metformin  HLD: Uncontrolled on last labs, wants to reduce via weight loss       Review of Systems   Pt denies lightheadedness, weakness, worsening fatigue, excessive dry mouth, mood changes, paresthesias.  All other ROS were reviewed and are otherwise unchanged from my previous visit with patient.    Physical Exam:    /82 (BP Location: Left arm, Patient Position: Sitting, BP Cuff Size: Large adult)   Pulse 88   Ht 1.702 m (5' 7\")   Wt 102 kg (224 lb 6.4 oz)   SpO2 95%   BMI 35.15 kg/m²   Waist Measurement   Waist: 43.5 inch/inches  Weight change since last visit: +8 lb (-15 lb total)  Waist Circum change since last visit: +0.5 in (-1.5 in total)    Constitutional: Oriented to person, place, and time and well-developed, well-nourished, and in no distress.    Head: " Normocephalic.   Musculoskeletal: Normal range of motion. No edema.   Neurological: Alert and oriented to person, place, and time. No muscle weakness.  Gait normal.   Skin: Warm and dry. Not diaphoretic.   Psychiatric: Mood, memory, affect and judgment normal.     Laboratory:   Recent labs reviewed.      Dietitian Assessment: N/A    ASSESSMENT  45 y.o.  male presents for intensive lifestyle intervention for treatment of obesity and co-morbid conditions.  Obesity Stage ([place])/Class: 1/2    1. Weight gain  phentermine (ADIPEX-P) 37.5 MG tablet   2. Impaired fasting glucose     3. Dyslipidemia     4. Obesity (BMI 30-39.9)  phentermine (ADIPEX-P) 37.5 MG tablet       The patient has had significant weight regain from eating poorly while on a varied work schedule, with poor sleep.  Can continue antiobesity medication but needs to make significant changes in his diet to affect lasting change in weight.  Will need to consider focus on diet first and foremost.  Continue metformin for fasting glucose control.  Monitor lipids.    PLAN  Weight Goal:  190 lb  Dietary intervention:    MR: Strongly encourage bringing 1-3 ND daily for work  High Protein/Low Carb whole food meals and 2 snacks between meals daily  If not tracking, needs to eat very small portions of fast food  Needs to consider bringing food with him for work  64+ oz water per day  Physical Activity: 12-hour very physical work shifts  Labs: N/A  Rx changes:   Phentermine 37.5 mg every morning  Discontinue if weight gain persists  Side Effects: Risks, benefits, alternatives discussed, consent signed.  Behavior modification:   Work on significant food related changes for better weight loss  Meal planning and prep will be needed  Surgical considerations: N/A  Follow-up: one month with MD    Patient's body mass index is 35.15 kg/m². Exercise and nutrition counseling were performed at this visit.

## 2021-02-24 RX ORDER — METFORMIN HYDROCHLORIDE 500 MG/1
TABLET, EXTENDED RELEASE ORAL
Qty: 90 TABLET | Refills: 1 | Status: SHIPPED | OUTPATIENT
Start: 2021-02-24 | End: 2022-03-22

## 2021-02-24 NOTE — TELEPHONE ENCOUNTER
Received request via: Pharmacy    Was the patient seen in the last year in this department? Yes     LV 02/16/21   FV  03/16/21    Does the patient have an active prescription (recently filled or refills available) for medication(s) requested? Yes

## 2021-03-29 DIAGNOSIS — R63.5 WEIGHT GAIN: ICD-10-CM

## 2021-03-29 DIAGNOSIS — E66.9 OBESITY (BMI 30-39.9): ICD-10-CM

## 2021-03-29 RX ORDER — PHENTERMINE HYDROCHLORIDE 37.5 MG/1
37.5 TABLET ORAL
Qty: 30 TABLET | Refills: 0 | Status: SHIPPED | OUTPATIENT
Start: 2021-03-29 | End: 2021-04-28

## 2021-05-13 ENCOUNTER — HOSPITAL ENCOUNTER (OUTPATIENT)
Dept: LAB | Facility: MEDICAL CENTER | Age: 46
End: 2021-05-13
Attending: PHYSICIAN ASSISTANT
Payer: COMMERCIAL

## 2021-05-13 LAB
BASOPHILS # BLD AUTO: 1 % (ref 0–1.8)
BASOPHILS # BLD: 0.06 K/UL (ref 0–0.12)
EOSINOPHIL # BLD AUTO: 0.23 K/UL (ref 0–0.51)
EOSINOPHIL NFR BLD: 3.9 % (ref 0–6.9)
ERYTHROCYTE [DISTWIDTH] IN BLOOD BY AUTOMATED COUNT: 47.8 FL (ref 35.9–50)
HCT VFR BLD AUTO: 46.8 % (ref 42–52)
HGB BLD-MCNC: 15.6 G/DL (ref 14–18)
IMM GRANULOCYTES # BLD AUTO: 0.01 K/UL (ref 0–0.11)
IMM GRANULOCYTES NFR BLD AUTO: 0.2 % (ref 0–0.9)
LYMPHOCYTES # BLD AUTO: 2.54 K/UL (ref 1–4.8)
LYMPHOCYTES NFR BLD: 42.8 % (ref 22–41)
MCH RBC QN AUTO: 32.1 PG (ref 27–33)
MCHC RBC AUTO-ENTMCNC: 33.3 G/DL (ref 33.7–35.3)
MCV RBC AUTO: 96.3 FL (ref 81.4–97.8)
MONOCYTES # BLD AUTO: 0.45 K/UL (ref 0–0.85)
MONOCYTES NFR BLD AUTO: 7.6 % (ref 0–13.4)
NEUTROPHILS # BLD AUTO: 2.64 K/UL (ref 1.82–7.42)
NEUTROPHILS NFR BLD: 44.5 % (ref 44–72)
NRBC # BLD AUTO: 0 K/UL
NRBC BLD-RTO: 0 /100 WBC
PLATELET # BLD AUTO: 220 K/UL (ref 164–446)
PMV BLD AUTO: 10.7 FL (ref 9–12.9)
RBC # BLD AUTO: 4.86 M/UL (ref 4.7–6.1)
WBC # BLD AUTO: 5.9 K/UL (ref 4.8–10.8)

## 2021-05-13 PROCEDURE — 85025 COMPLETE CBC W/AUTO DIFF WBC: CPT

## 2021-05-13 PROCEDURE — 84153 ASSAY OF PSA TOTAL: CPT

## 2021-05-13 PROCEDURE — 84154 ASSAY OF PSA FREE: CPT

## 2021-05-13 PROCEDURE — 84403 ASSAY OF TOTAL TESTOSTERONE: CPT

## 2021-05-13 PROCEDURE — 36415 COLL VENOUS BLD VENIPUNCTURE: CPT

## 2021-05-15 LAB
PSA FREE MFR SERPL: 44 %
PSA FREE SERPL-MCNC: 0.7 NG/ML
PSA SERPL-MCNC: 1.6 NG/ML (ref 0–4)
TESTOST SERPL-MCNC: 295 NG/DL (ref 300–890)

## 2021-11-15 ENCOUNTER — HOSPITAL ENCOUNTER (OUTPATIENT)
Dept: LAB | Facility: MEDICAL CENTER | Age: 46
End: 2021-11-15
Attending: PHYSICIAN ASSISTANT
Payer: COMMERCIAL

## 2021-11-15 LAB — TESTOST SERPL-MCNC: 298 NG/DL (ref 175–781)

## 2021-11-15 PROCEDURE — 36415 COLL VENOUS BLD VENIPUNCTURE: CPT

## 2021-11-15 PROCEDURE — 84403 ASSAY OF TOTAL TESTOSTERONE: CPT

## 2022-03-22 ENCOUNTER — OFFICE VISIT (OUTPATIENT)
Dept: MEDICAL GROUP | Facility: PHYSICIAN GROUP | Age: 47
End: 2022-03-22
Payer: COMMERCIAL

## 2022-03-22 VITALS
DIASTOLIC BLOOD PRESSURE: 80 MMHG | HEART RATE: 80 BPM | BODY MASS INDEX: 32.5 KG/M2 | OXYGEN SATURATION: 95 % | WEIGHT: 227 LBS | SYSTOLIC BLOOD PRESSURE: 140 MMHG | TEMPERATURE: 98.4 F | RESPIRATION RATE: 16 BRPM | HEIGHT: 70 IN

## 2022-03-22 DIAGNOSIS — N52.8 OTHER MALE ERECTILE DYSFUNCTION: ICD-10-CM

## 2022-03-22 DIAGNOSIS — R05.9 COUGH: ICD-10-CM

## 2022-03-22 DIAGNOSIS — Z00.00 HEALTHCARE MAINTENANCE: ICD-10-CM

## 2022-03-22 PROBLEM — R63.5 WEIGHT GAIN: Status: RESOLVED | Noted: 2020-06-17 | Resolved: 2022-03-22

## 2022-03-22 PROBLEM — R00.2 PALPITATIONS: Status: RESOLVED | Noted: 2017-07-03 | Resolved: 2022-03-22

## 2022-03-22 PROCEDURE — 99214 OFFICE O/P EST MOD 30 MIN: CPT | Performed by: STUDENT IN AN ORGANIZED HEALTH CARE EDUCATION/TRAINING PROGRAM

## 2022-03-22 RX ORDER — TADALAFIL 10 MG/1
10 TABLET ORAL PRN
Qty: 10 TABLET | Refills: 3 | Status: SHIPPED | OUTPATIENT
Start: 2022-03-22 | End: 2022-07-09 | Stop reason: SDUPTHER

## 2022-03-22 RX ORDER — PHENTERMINE HYDROCHLORIDE 37.5 MG/1
TABLET ORAL
COMMUNITY
End: 2022-03-22

## 2022-03-22 RX ORDER — TESTOSTERONE CYPIONATE 200 MG/ML
INJECTION, SOLUTION INTRAMUSCULAR
COMMUNITY
End: 2023-04-11

## 2022-03-22 RX ORDER — BENZONATATE 100 MG/1
100 CAPSULE ORAL 3 TIMES DAILY PRN
Qty: 60 CAPSULE | Refills: 0 | Status: SHIPPED | OUTPATIENT
Start: 2022-03-22 | End: 2023-04-11

## 2022-03-22 RX ORDER — KETOCONAZOLE 20 MG/ML
SHAMPOO TOPICAL
COMMUNITY
End: 2022-03-22

## 2022-03-22 ASSESSMENT — PATIENT HEALTH QUESTIONNAIRE - PHQ9: CLINICAL INTERPRETATION OF PHQ2 SCORE: 0

## 2022-03-22 ASSESSMENT — FIBROSIS 4 INDEX: FIB4 SCORE: 0.57

## 2022-03-22 NOTE — PROGRESS NOTES
Subjective:     CC:  Diagnoses of Other male erectile dysfunction, Healthcare maintenance, and Cough were pertinent to this visit.    HISTORY OF THE PRESENT ILLNESS: Patient is a 46 y.o. male.  He is a .  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  He is lead tested by his employer regularly.  He does have to wear a respirator while working.  This pleasant patient is here today to establish care. His prior PCP was Jorge SHARP.  He does see Dr. Sixto Castelan, urology.    1.  ED  This is been ongoing for couple years.  Despite full compliance with testosterone supplementation Mr. Purcell is still having some difficulties in sustaining an adequate erection.  He is interested in trying a medication to help this.    2.  Cough  Onset 2 to 3 days.  Symptoms are cough, stuffy nose, ear congestion, sore throat, tearing of the eyes.  A number of his coworkers have had similar symptoms of the past several days.    He is negative for NVD, rash, headache, fever, change of appetite.    Active Diagnosis:    Patient Active Problem List   Diagnosis   • Obesity (BMI 30-39.9)   • Impaired fasting glucose   • Dyslipidemia   • Hyperhidrosis   • Other male erectile dysfunction          Current Outpatient Medications Ordered in Epic   Medication Sig Dispense Refill   • testosterone cypionate (DEPO-TESTOSTERONE) 200 MG/ML Solution injection testosterone cypionate 200 mg/mL intramuscular oil     • tadalafil (CIALIS) 10 MG tablet Take 1 Tablet by mouth as needed for Erectile Dysfunction. 10 Tablet 3   • benzonatate (TESSALON) 100 MG Cap Take 1 Capsule by mouth 3 times a day as needed for Cough. 60 Capsule 0     No current Epic-ordered facility-administered medications on file.     ROS:   Gen: No fevers/chills, no changes in weight  HEENT: No changes in vision/hearing.  Pulm: See HPI.  CV: No chest pain/pressure, no palpitations  GI: No nausea/vomiting, no diarrhea  : No dysuria/nocturia  MSk: No  "myalgias  Skin: No rash/skin changes  Neuro: No headaches, no numbness/tingling  Heme/Lymph: no easy bruising      Objective:     Exam: /80 (BP Location: Left arm, Patient Position: Sitting, BP Cuff Size: Large adult)   Pulse 80   Temp 36.9 °C (98.4 °F) (Temporal)   Resp 16   Ht 1.778 m (5' 10\")   Wt 103 kg (227 lb)   SpO2 95%  Body mass index is 32.57 kg/m².    General: Normal appearing. No distress.  HEENT: Normocephalic. Eyes conjunctiva clear lids without ptosis, pupils equal and reactive to light accommodation.  His eyes are very sensitive to light.  Ears normal shape and contour, canals are clear bilaterally, tympanic membranes are benign, nasal mucosa benign, oropharynx is without erythema, edema or exudates.  Total.  Worse.  Neck: Supple without JVD. Thyroid is not enlarged.  Pulmonary: Clear to ausculation.  Normal effort. No rales, ronchi, or wheezing.  Cardiovascular: Regular rate and rhythm without murmur. Radial pulses are intact and equal bilaterally.  Abdomen: Obese.    Neurologic: Grossly nonfocal.  CN II through XII intact.  Lymph: No cervical or supraclavicular lymph nodes are palpable  Skin: Warm and dry.  No obvious lesions.  Musculoskeletal: Normal gait. No extremity cyanosis, clubbing, or edema.  Psych: Normal mood and affect. Alert and oriented x3. Judgment and insight are normal.    A chaperone was offered to the patient during today's exam. Patient declined chaperone.    Labs:   5/13/2021:  -CBC, WNL    Assessment & Plan:   46 y.o. male with the following -    1. Other male erectile dysfunction  -Chronic, untreated.  -Tadalafil 10 mg as needed.  Dispense 10.  Refill 3.    2.  Cough  -Acute.  -Highly likely viral URI.  -I provided the patient with education to let him know that treatment is symptomatic only.  -Benzonatate tablets 100 mg 3 times daily as needed.  Dispense 60.  Refill 0.    3.  Healthcare maintenance  -We discussed improving his diet.  He does see dentistry " regularly and always wears a seatbelt.  -Up-to-date on vaccines with exception of influenza.  -CBC, CMP, hemoglobin A1c.    Return in about 1 year (around 3/22/2023).    Please note that this dictation was created using voice recognition software. I have made every reasonable attempt to correct obvious errors, but I expect that there are errors of grammar and possibly content that I did not discover before finalizing the note.    Dony Cosme PA-C 3/22/2022

## 2022-03-25 ENCOUNTER — HOSPITAL ENCOUNTER (OUTPATIENT)
Dept: LAB | Facility: MEDICAL CENTER | Age: 47
End: 2022-03-25
Attending: STUDENT IN AN ORGANIZED HEALTH CARE EDUCATION/TRAINING PROGRAM
Payer: COMMERCIAL

## 2022-03-25 DIAGNOSIS — Z00.00 HEALTHCARE MAINTENANCE: ICD-10-CM

## 2022-03-25 LAB
ALBUMIN SERPL BCP-MCNC: 4.6 G/DL (ref 3.2–4.9)
ALBUMIN/GLOB SERPL: 2.2 G/DL
ALP SERPL-CCNC: 78 U/L (ref 30–99)
ALT SERPL-CCNC: 17 U/L (ref 2–50)
ANION GAP SERPL CALC-SCNC: 12 MMOL/L (ref 7–16)
AST SERPL-CCNC: 20 U/L (ref 12–45)
BILIRUB SERPL-MCNC: 0.6 MG/DL (ref 0.1–1.5)
BUN SERPL-MCNC: 14 MG/DL (ref 8–22)
CALCIUM SERPL-MCNC: 8.8 MG/DL (ref 8.5–10.5)
CHLORIDE SERPL-SCNC: 102 MMOL/L (ref 96–112)
CO2 SERPL-SCNC: 24 MMOL/L (ref 20–33)
CREAT SERPL-MCNC: 0.89 MG/DL (ref 0.5–1.4)
ERYTHROCYTE [DISTWIDTH] IN BLOOD BY AUTOMATED COUNT: 44.7 FL (ref 35.9–50)
EST. AVERAGE GLUCOSE BLD GHB EST-MCNC: 111 MG/DL
GFR SERPLBLD CREATININE-BSD FMLA CKD-EPI: 108 ML/MIN/1.73 M 2
GLOBULIN SER CALC-MCNC: 2.1 G/DL (ref 1.9–3.5)
GLUCOSE SERPL-MCNC: 85 MG/DL (ref 65–99)
HBA1C MFR BLD: 5.5 % (ref 4–5.6)
HCT VFR BLD AUTO: 52.5 % (ref 42–52)
HGB BLD-MCNC: 18.2 G/DL (ref 14–18)
MCH RBC QN AUTO: 33 PG (ref 27–33)
MCHC RBC AUTO-ENTMCNC: 34.7 G/DL (ref 33.7–35.3)
MCV RBC AUTO: 95.1 FL (ref 81.4–97.8)
PLATELET # BLD AUTO: 190 K/UL (ref 164–446)
PMV BLD AUTO: 10.9 FL (ref 9–12.9)
POTASSIUM SERPL-SCNC: 5.2 MMOL/L (ref 3.6–5.5)
PROT SERPL-MCNC: 6.7 G/DL (ref 6–8.2)
RBC # BLD AUTO: 5.52 M/UL (ref 4.7–6.1)
SODIUM SERPL-SCNC: 138 MMOL/L (ref 135–145)
WBC # BLD AUTO: 5.6 K/UL (ref 4.8–10.8)

## 2022-03-25 PROCEDURE — 36415 COLL VENOUS BLD VENIPUNCTURE: CPT

## 2022-03-25 PROCEDURE — 83036 HEMOGLOBIN GLYCOSYLATED A1C: CPT

## 2022-03-25 PROCEDURE — 85027 COMPLETE CBC AUTOMATED: CPT

## 2022-03-25 PROCEDURE — 80053 COMPREHEN METABOLIC PANEL: CPT

## 2022-04-20 ENCOUNTER — OFFICE VISIT (OUTPATIENT)
Dept: DERMATOLOGY | Facility: IMAGING CENTER | Age: 47
End: 2022-04-20
Payer: COMMERCIAL

## 2022-04-20 ENCOUNTER — TELEPHONE (OUTPATIENT)
Dept: DERMATOLOGY | Facility: IMAGING CENTER | Age: 47
End: 2022-04-20

## 2022-04-20 DIAGNOSIS — L70.0 ACNE VULGARIS: ICD-10-CM

## 2022-04-20 PROCEDURE — 99214 OFFICE O/P EST MOD 30 MIN: CPT | Performed by: NURSE PRACTITIONER

## 2022-04-20 RX ORDER — SULFACETAMIDE SODIUM, SULFUR 90; 45 MG/G; MG/G
LIQUID TOPICAL
Qty: 454 G | Refills: 1 | Status: SHIPPED | OUTPATIENT
Start: 2022-04-20

## 2022-04-20 RX ORDER — DOXYCYCLINE HYCLATE 100 MG
TABLET ORAL
Qty: 126 TABLET | Refills: 0 | Status: SHIPPED | OUTPATIENT
Start: 2022-04-20 | End: 2023-04-11

## 2022-04-20 NOTE — PROGRESS NOTES
DERMATOLOGY NOTE  FOLLOW UP VISIT       Chief complaint: Acne       Acne  Started: 6 mths   Active on: chest back and shoulders  Aggravated by: n/a    Pt states 1.5 year ago tx for testosterone was started   Treatment currently used: none  Prior treatments used: none  Face wash/moisturizer: none   Family history of scarring acne: no      No Known Allergies     MEDICATIONS:  Medications relevant to specialty reviewed.     REVIEW OF SYSTEMS:   Positive for skin (see HPI)  Negative for fevers and chills       EXAM:  There were no vitals taken for this visit.  Constitutional: Well-developed, well-nourished, and in no distress.     A focused skin exam was performed including the affected areas of the back, chest and neck. Notable findings on exam today listed below and/or in assessment/plan.     numerous erythematous papules, several pustules, several open and closed comedones, mostly concentrated on back, chest, arms and neck in beard line, declined full face assessement    IMPRESSION / PLAN:    1. Acne vulgaris, comedonal and inflammatory , moderate   Likely associated with testosterone replacement  - educated patient about diagnosis, management options, and expectations of treatment  - recommended consistent use of OTC daily face wash (cedaphil or cerave)  - start doxycycline 100mg BID for 6 weeks, then once a day for 6 weeks then stop. Instructed to take with food and water. Side effects, including GI upset, sun sensitivity, yeast infections discussed.  - Sulfacetamide body wash 3-4 times per week  - OTC moisturizers strongly recommended  Follow up in 3-4 months      - doxycycline (VIBRAMYCIN) 100 MG Tab; Take 1 pill twice a day for 6 weeks, then 1 pill daily for 6 weeks, then stop  Dispense: 126 Tablet; Refill: 0  - Sulfacetamide Sodium-Sulfur (SULFACETAMIDE SOD-SULFUR WASH) 9-4.5 % Liquid; AAA 3-4 time per week as needed  Dispense: 454 g; Refill: 1      Discussed risks, benefits, alternative treatments as well as  common side effects associated with prescribed treatment, Patient verbalized understanding and agrees with plan regarding the above        I have performed a physical exam and reviewed and updated ROS and Plan today (4/20/2022). In review of dermatology visit (4/19/2022), there are no changes except as documented above.          Please note that this dictation was created using voice recognition software. I have made every reasonable attempt to correct obvious errors, but I expect that there are errors of grammar and possibly content that I did not discover before finalizing the note.      Return to clinic in: Return for 3-4 months, acne follow up. and as needed for any new or changing skin lesions.

## 2022-05-20 ENCOUNTER — HOSPITAL ENCOUNTER (OUTPATIENT)
Dept: LAB | Facility: MEDICAL CENTER | Age: 47
End: 2022-05-20
Attending: PHYSICIAN ASSISTANT
Payer: COMMERCIAL

## 2022-05-20 LAB
BASOPHILS # BLD AUTO: 0.8 % (ref 0–1.8)
BASOPHILS # BLD: 0.04 K/UL (ref 0–0.12)
EOSINOPHIL # BLD AUTO: 0.26 K/UL (ref 0–0.51)
EOSINOPHIL NFR BLD: 5.1 % (ref 0–6.9)
ERYTHROCYTE [DISTWIDTH] IN BLOOD BY AUTOMATED COUNT: 46.5 FL (ref 35.9–50)
ESTRADIOL SERPL-MCNC: 9.3 PG/ML
HCT VFR BLD AUTO: 52.4 % (ref 42–52)
HGB BLD-MCNC: 18.3 G/DL (ref 14–18)
IMM GRANULOCYTES # BLD AUTO: 0.01 K/UL (ref 0–0.11)
IMM GRANULOCYTES NFR BLD AUTO: 0.2 % (ref 0–0.9)
LYMPHOCYTES # BLD AUTO: 1.76 K/UL (ref 1–4.8)
LYMPHOCYTES NFR BLD: 34.6 % (ref 22–41)
MCH RBC QN AUTO: 33.5 PG (ref 27–33)
MCHC RBC AUTO-ENTMCNC: 34.9 G/DL (ref 33.7–35.3)
MCV RBC AUTO: 95.8 FL (ref 81.4–97.8)
MONOCYTES # BLD AUTO: 0.35 K/UL (ref 0–0.85)
MONOCYTES NFR BLD AUTO: 6.9 % (ref 0–13.4)
NEUTROPHILS # BLD AUTO: 2.66 K/UL (ref 1.82–7.42)
NEUTROPHILS NFR BLD: 52.4 % (ref 44–72)
NRBC # BLD AUTO: 0 K/UL
NRBC BLD-RTO: 0 /100 WBC
PLATELET # BLD AUTO: 175 K/UL (ref 164–446)
PMV BLD AUTO: 10.4 FL (ref 9–12.9)
RBC # BLD AUTO: 5.47 M/UL (ref 4.7–6.1)
TESTOST SERPL-MCNC: 167 NG/DL (ref 175–781)
WBC # BLD AUTO: 5.1 K/UL (ref 4.8–10.8)

## 2022-05-20 PROCEDURE — 85025 COMPLETE CBC W/AUTO DIFF WBC: CPT

## 2022-05-20 PROCEDURE — 84403 ASSAY OF TOTAL TESTOSTERONE: CPT

## 2022-05-20 PROCEDURE — 82670 ASSAY OF TOTAL ESTRADIOL: CPT

## 2022-05-20 PROCEDURE — 36415 COLL VENOUS BLD VENIPUNCTURE: CPT

## 2022-07-11 RX ORDER — TADALAFIL 10 MG/1
10 TABLET ORAL PRN
Qty: 10 TABLET | Refills: 10 | Status: SHIPPED | OUTPATIENT
Start: 2022-07-11 | End: 2022-11-21

## 2022-10-19 ENCOUNTER — OFFICE VISIT (OUTPATIENT)
Dept: DERMATOLOGY | Facility: IMAGING CENTER | Age: 47
End: 2022-10-19
Payer: COMMERCIAL

## 2022-10-19 DIAGNOSIS — L70.0 ACNE VULGARIS: ICD-10-CM

## 2022-10-19 PROCEDURE — 99212 OFFICE O/P EST SF 10 MIN: CPT | Performed by: NURSE PRACTITIONER

## 2022-10-19 NOTE — PROGRESS NOTES
DERMATOLOGY NOTE  FOLLOW UP VISIT       Chief complaint: Acne   Pt is here today for Acne follow up. Pt states there's no new breakouts. Pt also stated he reduced the use of testosterone and said it has helped with the improvement. Doxy and Sulfacetamide has improved as well        Prev Visit 04/20/2022:  Acne  Started: 6 mths   Active on: chest back and shoulders  Aggravated by: n/a    Pt states 1.5 year ago tx for testosterone was started   Treatment currently used: none  Prior treatments used: none  Face wash/moisturizer: none   Family history of scarring acne: no      No Known Allergies     MEDICATIONS:  Medications relevant to specialty reviewed.     REVIEW OF SYSTEMS:   Positive for skin (see HPI)  Negative for fevers and chills       EXAM:  There were no vitals taken for this visit.  Constitutional: Well-developed, well-nourished, and in no distress.     A focused skin exam was performed including the affected areas of the back, chest and neck. Notable findings on exam today listed below and/or in assessment/plan.     few erythematous papules, few pustules, few open and closed comedones, mostly concentrated on back, chest, arms with notable acne scarring    IMPRESSION / PLAN:    1. Acne vulgaris, comedonal and inflammatory , moderate   Likely associated with testosterone replacement--much improved  - re-educated patient about diagnosis, management options, and expectations of treatment  - recommended consistent use of OTC daily face wash (cedaphil or cerave)  - can continue using sulfacetamide body wash 2-3 times per week, while use body wash with SA on off days  - OTC moisturizers strongly recommended  Follow up annually and as needed, call for refills as needed        Discussed risks, benefits, alternative treatments as well as common side effects associated with prescribed treatment, Patient verbalized understanding and agrees with plan regarding the above        I have performed a physical exam and  reviewed and updated ROS and Plan today (10/19/2022). In review of dermatology visit( 4/20/2022), there are no changes except as documented above.          Please note that this dictation was created using voice recognition software. I have made every reasonable attempt to correct obvious errors, but I expect that there are errors of grammar and possibly content that I did not discover before finalizing the note.      Return to clinic in: Return for annually and as needed. and as needed for any new or changing skin lesions.

## 2022-10-21 ENCOUNTER — HOSPITAL ENCOUNTER (OUTPATIENT)
Dept: LAB | Facility: MEDICAL CENTER | Age: 47
End: 2022-10-21
Attending: STUDENT IN AN ORGANIZED HEALTH CARE EDUCATION/TRAINING PROGRAM
Payer: COMMERCIAL

## 2022-10-21 LAB
BASOPHILS # BLD AUTO: 0.7 % (ref 0–1.8)
BASOPHILS # BLD: 0.04 K/UL (ref 0–0.12)
EOSINOPHIL # BLD AUTO: 0.22 K/UL (ref 0–0.51)
EOSINOPHIL NFR BLD: 3.8 % (ref 0–6.9)
ERYTHROCYTE [DISTWIDTH] IN BLOOD BY AUTOMATED COUNT: 46.9 FL (ref 35.9–50)
HCT VFR BLD AUTO: 52.6 % (ref 42–52)
HGB BLD-MCNC: 18.6 G/DL (ref 14–18)
IMM GRANULOCYTES # BLD AUTO: 0.02 K/UL (ref 0–0.11)
IMM GRANULOCYTES NFR BLD AUTO: 0.3 % (ref 0–0.9)
LYMPHOCYTES # BLD AUTO: 1.89 K/UL (ref 1–4.8)
LYMPHOCYTES NFR BLD: 32.6 % (ref 22–41)
MCH RBC QN AUTO: 33.3 PG (ref 27–33)
MCHC RBC AUTO-ENTMCNC: 35.4 G/DL (ref 33.7–35.3)
MCV RBC AUTO: 94.3 FL (ref 81.4–97.8)
MONOCYTES # BLD AUTO: 0.33 K/UL (ref 0–0.85)
MONOCYTES NFR BLD AUTO: 5.7 % (ref 0–13.4)
NEUTROPHILS # BLD AUTO: 3.3 K/UL (ref 1.82–7.42)
NEUTROPHILS NFR BLD: 56.9 % (ref 44–72)
NRBC # BLD AUTO: 0 K/UL
NRBC BLD-RTO: 0 /100 WBC
PLATELET # BLD AUTO: 185 K/UL (ref 164–446)
PMV BLD AUTO: 10.9 FL (ref 9–12.9)
RBC # BLD AUTO: 5.58 M/UL (ref 4.7–6.1)
WBC # BLD AUTO: 5.8 K/UL (ref 4.8–10.8)

## 2022-10-21 PROCEDURE — 84153 ASSAY OF PSA TOTAL: CPT

## 2022-10-21 PROCEDURE — 36415 COLL VENOUS BLD VENIPUNCTURE: CPT

## 2022-10-21 PROCEDURE — 84402 ASSAY OF FREE TESTOSTERONE: CPT

## 2022-10-21 PROCEDURE — 84403 ASSAY OF TOTAL TESTOSTERONE: CPT

## 2022-10-21 PROCEDURE — 85025 COMPLETE CBC W/AUTO DIFF WBC: CPT

## 2022-10-21 PROCEDURE — 84270 ASSAY OF SEX HORMONE GLOBUL: CPT

## 2022-10-21 PROCEDURE — 84154 ASSAY OF PSA FREE: CPT

## 2022-10-23 LAB
PSA FREE MFR SERPL: 62 %
PSA FREE SERPL-MCNC: 1 NG/ML
PSA SERPL-MCNC: 1.6 NG/ML (ref 0–4)
SHBG SERPL-SCNC: 20 NMOL/L (ref 17–56)
TESTOST FREE MFR SERPL: 2.7 % (ref 1.6–2.9)
TESTOST FREE SERPL-MCNC: 302 PG/ML (ref 47–244)
TESTOST SERPL-MCNC: 1138 NG/DL (ref 300–890)

## 2023-02-01 ENCOUNTER — OFFICE VISIT (OUTPATIENT)
Dept: DERMATOLOGY | Facility: IMAGING CENTER | Age: 48
End: 2023-02-01
Payer: COMMERCIAL

## 2023-02-01 DIAGNOSIS — L64.9 ANDROGENETIC ALOPECIA: ICD-10-CM

## 2023-02-01 PROCEDURE — 99213 OFFICE O/P EST LOW 20 MIN: CPT | Performed by: NURSE PRACTITIONER

## 2023-02-01 NOTE — PROGRESS NOTES
DERMATOLOGY NOTE  FOLLOW UP VISIT       Chief complaint: Hair Loss and Follow-Up     Hairloss location: scalp or other locations too? Scalp losing a couple years ago  Any other symptoms (itching, scaling, redness, other)? No  Any dietary restrictions? (vegan, vegetarian/other)No  Any family history of hair thinning/balding in women or men? father  Any illnesses, infections, pregnancy, or high stress 3-9 months preceding loss? Work is stressful  Any labs done? No    Treatments tried: No          No Known Allergies     MEDICATIONS:  Medications relevant to specialty reviewed.     REVIEW OF SYSTEMS:   Positive for skin (see HPI)  Negative for fevers and chills       EXAM:  There were no vitals taken for this visit.  Constitutional: Well-developed, well-nourished, and in no distress.     A focused skin exam was performed including the affected areas of the scalp. Notable findings on exam today listed below and/or in assessment/plan.     Notable hair thinning and loss at crown and bilateral parietal scalp    IMPRESSION / PLAN:    1. Androgenetic alopecia  Discussed course/nature of disease  Likely worsened by testosterone HRT  Discussed tx options  Advised of good hair health habits  Will trial otc minoxidil BID  Follow up 3 months      Patient verbalized understanding and agrees with plan regarding the above          Please note that this dictation was created using voice recognition software. I have made every reasonable attempt to correct obvious errors, but I expect that there are errors of grammar and possibly content that I did not discover before finalizing the note.      Return to clinic in: Return in about 3 months (around 5/1/2023) for hair loss. and as needed for any new or changing skin lesions.

## 2023-03-31 ENCOUNTER — APPOINTMENT (OUTPATIENT)
Dept: MEDICAL GROUP | Facility: PHYSICIAN GROUP | Age: 48
End: 2023-03-31
Payer: COMMERCIAL

## 2023-04-11 ENCOUNTER — OFFICE VISIT (OUTPATIENT)
Dept: MEDICAL GROUP | Facility: PHYSICIAN GROUP | Age: 48
End: 2023-04-11
Payer: COMMERCIAL

## 2023-04-11 ENCOUNTER — HOSPITAL ENCOUNTER (OUTPATIENT)
Dept: LAB | Facility: MEDICAL CENTER | Age: 48
End: 2023-04-11
Attending: STUDENT IN AN ORGANIZED HEALTH CARE EDUCATION/TRAINING PROGRAM
Payer: COMMERCIAL

## 2023-04-11 VITALS
TEMPERATURE: 97.7 F | SYSTOLIC BLOOD PRESSURE: 130 MMHG | RESPIRATION RATE: 16 BRPM | WEIGHT: 225.4 LBS | BODY MASS INDEX: 32.27 KG/M2 | DIASTOLIC BLOOD PRESSURE: 80 MMHG | HEART RATE: 70 BPM | HEIGHT: 70 IN | OXYGEN SATURATION: 95 %

## 2023-04-11 DIAGNOSIS — Z23 NEED FOR VACCINATION: ICD-10-CM

## 2023-04-11 DIAGNOSIS — Z12.11 SCREENING FOR COLON CANCER: ICD-10-CM

## 2023-04-11 DIAGNOSIS — Z11.59 ENCOUNTER FOR HEPATITIS C SCREENING TEST FOR LOW RISK PATIENT: ICD-10-CM

## 2023-04-11 DIAGNOSIS — Z00.00 HEALTHCARE MAINTENANCE: ICD-10-CM

## 2023-04-11 DIAGNOSIS — L65.9 HAIR LOSS: ICD-10-CM

## 2023-04-11 DIAGNOSIS — N52.8 OTHER MALE ERECTILE DYSFUNCTION: ICD-10-CM

## 2023-04-11 LAB
ERYTHROCYTE [DISTWIDTH] IN BLOOD BY AUTOMATED COUNT: 48.4 FL (ref 35.9–50)
HCT VFR BLD AUTO: 45.9 % (ref 42–52)
HCV AB SER QL: NORMAL
HGB BLD-MCNC: 15.8 G/DL (ref 14–18)
MCH RBC QN AUTO: 32.9 PG (ref 27–33)
MCHC RBC AUTO-ENTMCNC: 34.4 G/DL (ref 33.7–35.3)
MCV RBC AUTO: 95.6 FL (ref 81.4–97.8)
PLATELET # BLD AUTO: 176 K/UL (ref 164–446)
PMV BLD AUTO: 11 FL (ref 9–12.9)
RBC # BLD AUTO: 4.8 M/UL (ref 4.7–6.1)
WBC # BLD AUTO: 4.8 K/UL (ref 4.8–10.8)

## 2023-04-11 PROCEDURE — 85027 COMPLETE CBC AUTOMATED: CPT

## 2023-04-11 PROCEDURE — 80061 LIPID PANEL: CPT

## 2023-04-11 PROCEDURE — 80053 COMPREHEN METABOLIC PANEL: CPT

## 2023-04-11 PROCEDURE — 99214 OFFICE O/P EST MOD 30 MIN: CPT | Performed by: STUDENT IN AN ORGANIZED HEALTH CARE EDUCATION/TRAINING PROGRAM

## 2023-04-11 PROCEDURE — 86803 HEPATITIS C AB TEST: CPT

## 2023-04-11 PROCEDURE — 36415 COLL VENOUS BLD VENIPUNCTURE: CPT

## 2023-04-11 RX ORDER — TADALAFIL 10 MG/1
10 TABLET ORAL PRN
Qty: 30 TABLET | Refills: 3 | Status: SHIPPED | OUTPATIENT
Start: 2023-04-11

## 2023-04-11 RX ORDER — FINASTERIDE 1 MG/1
1 TABLET, FILM COATED ORAL DAILY
Qty: 30 TABLET | Refills: 2 | Status: SHIPPED | OUTPATIENT
Start: 2023-04-11

## 2023-04-11 RX ORDER — FINASTERIDE 1 MG/1
1 TABLET, FILM COATED ORAL DAILY
Qty: 90 TABLET | Refills: 1 | Status: SHIPPED | OUTPATIENT
Start: 2023-04-11 | End: 2023-04-11

## 2023-04-11 RX ORDER — SODIUM SULFACETAMIDE, SULFUR 90; 40 MG/473.2ML; MG/473.2ML
LIQUID TOPICAL
COMMUNITY
End: 2023-04-11

## 2023-04-11 RX ORDER — TESTOSTERONE CYPIONATE 200 MG/ML
0.75 INJECTION, SOLUTION INTRAMUSCULAR
COMMUNITY
End: 2023-04-11

## 2023-04-11 RX ORDER — SYRINGE WITH NEEDLE, 1 ML 25GX5/8"
SYRINGE, EMPTY DISPOSABLE MISCELLANEOUS
COMMUNITY
End: 2023-07-28

## 2023-04-11 ASSESSMENT — FIBROSIS 4 INDEX: FIB4 SCORE: 1.18

## 2023-04-11 ASSESSMENT — PATIENT HEALTH QUESTIONNAIRE - PHQ9: CLINICAL INTERPRETATION OF PHQ2 SCORE: 0

## 2023-04-12 LAB
ALBUMIN SERPL BCP-MCNC: 4 G/DL (ref 3.2–4.9)
ALBUMIN/GLOB SERPL: 1.6 G/DL
ALP SERPL-CCNC: 69 U/L (ref 30–99)
ALT SERPL-CCNC: 20 U/L (ref 2–50)
ANION GAP SERPL CALC-SCNC: 14 MMOL/L (ref 7–16)
AST SERPL-CCNC: 14 U/L (ref 12–45)
BILIRUB SERPL-MCNC: 0.5 MG/DL (ref 0.1–1.5)
BUN SERPL-MCNC: 20 MG/DL (ref 8–22)
CALCIUM ALBUM COR SERPL-MCNC: 8.6 MG/DL (ref 8.5–10.5)
CALCIUM SERPL-MCNC: 8.6 MG/DL (ref 8.5–10.5)
CHLORIDE SERPL-SCNC: 107 MMOL/L (ref 96–112)
CHOLEST SERPL-MCNC: 241 MG/DL (ref 100–199)
CO2 SERPL-SCNC: 21 MMOL/L (ref 20–33)
CREAT SERPL-MCNC: 0.98 MG/DL (ref 0.5–1.4)
FASTING STATUS PATIENT QL REPORTED: NORMAL
GFR SERPLBLD CREATININE-BSD FMLA CKD-EPI: 96 ML/MIN/1.73 M 2
GLOBULIN SER CALC-MCNC: 2.5 G/DL (ref 1.9–3.5)
GLUCOSE SERPL-MCNC: 94 MG/DL (ref 65–99)
HDLC SERPL-MCNC: 45 MG/DL
LDLC SERPL CALC-MCNC: 154 MG/DL
POTASSIUM SERPL-SCNC: 4.2 MMOL/L (ref 3.6–5.5)
PROT SERPL-MCNC: 6.5 G/DL (ref 6–8.2)
SODIUM SERPL-SCNC: 142 MMOL/L (ref 135–145)
TRIGL SERPL-MCNC: 212 MG/DL (ref 0–149)

## 2023-04-13 ENCOUNTER — APPOINTMENT (OUTPATIENT)
Dept: MEDICAL GROUP | Facility: PHYSICIAN GROUP | Age: 48
End: 2023-04-13
Payer: COMMERCIAL

## 2023-04-18 NOTE — PROGRESS NOTES
"CC:  Diagnoses of Hair loss, Need for vaccination, Other male erectile dysfunction, Healthcare maintenance, Screening for colon cancer, and Encounter for hepatitis C screening test for low risk patient were pertinent to this visit.    HISTORY OF THE PRESENT ILLNESS: Patient is a 47 y.o. male. This pleasant patient is here today to discuss:    1. Hair loss  Patient complains of classic male pattern baldness.    2. Need for vaccination  In need of his first hepatitis B vaccine.    3. Other male erectile dysfunction  Patient has difficulty with achieving and maintaining erection more times than not.  This has been developing over the last several years.  He does still get morning erections although feels that these are less firm than in the past.  Libido is intact.    4. Healthcare maintenance  Poor diet.  Poor exercise.    5. Screening for colon cancer  Patient has never had colon cancer screening.    6. Encounter for hepatitis C screening test for low risk patient  Not at increased risk.      Active Diagnosis:    Patient Active Problem List   Diagnosis    Obesity (BMI 30-39.9)    Impaired fasting glucose    Dyslipidemia    Hyperhidrosis    Other male erectile dysfunction    Hair loss      Current Outpatient Medications Ordered in Epic   Medication Sig Dispense Refill    tadalafil (CIALIS) 10 MG tablet Take 1 Tablet by mouth as needed for Erectile Dysfunction. 30 Tablet 3    finasteride (PROPECIA) 1 MG tablet Take 1 Tablet by mouth every day. 30 Tablet 2    Sulfacetamide Sodium-Sulfur (SULFACETAMIDE SOD-SULFUR WASH) 9-4.5 % Liquid AAA 3-4 time per week as needed 454 g 1    SYRINGE-NEEDLE, DISP, 3 ML (B-D 3CC LUER-LUCAS SYR 11CN6-8/2) 18G X 1-1/2\" 3 ML Misc BD Luer-Lucas Syringe 3 mL 22 gauge x 1\"   TO BE USED FOR DEPO TESTOSTERONE INJECTIONS       No current Epic-ordered facility-administered medications on file.     ROS:   See HPI.    Objective:     Exam: /80 (BP Location: Left arm, Patient Position: Sitting, BP " "Cuff Size: Large adult long)   Pulse 70   Temp 36.5 °C (97.7 °F) (Temporal)   Resp 16   Ht 1.772 m (5' 9.75\")   Wt 102 kg (225 lb 6.4 oz)   SpO2 95%  Body mass index is 32.57 kg/m².    General: Normal appearing. No distress.  Pulmonary: Clear to ausculation.  Normal effort. No rales, ronchi, or wheezing.  Cardiovascular: Regular rate and rhythm without murmur.   neurologic: Grossly nonfocal.    Skin: Warm and dry.  No obvious lesions.  Evident male pattern baldness.  No abnormalities of the scalp.  Musculoskeletal: No extremity cyanosis, clubbing, or edema.  Psych: Normal mood and affect.             Assessment & Plan:   47 y.o. male with the following -    Labs:   10/21/2022:  -CBC is showing H&H of 18.6/52.6.  -CMP, WNL  -Lipid profile showing total cholesterol 241, triglycerides 212, HDL 45, .    1. Hair loss  -Chronic, progressive.  - finasteride (PROPECIA) 1 MG tablet; Take 1 Tablet by mouth every day.  Dispense: 30 Tablet; Refill: 2    2. Need for vaccination  -Hepatitis B vaccine recommended, patient declines at last minute.    3. Other male erectile dysfunction  -Chronic.  -Trial with tadalafil (CIALIS) 10 MG tablet; Take 1 Tablet by mouth as needed for Erectile Dysfunction.  Dispense: 30 Tablet; Refill: 3    4. Healthcare maintenance  -Improved diet and exercise have been recommended at today's visit.  - CBC WITHOUT DIFFERENTIAL; Future  - Comp Metabolic Panel; Future  - Lipid Profile; Future    5. Screening for colon cancer  - Referral to GI for Colonoscopy    6. Encounter for hepatitis C screening test for low risk patient  - HEP C VIRUS ANTIBODY; Future    Return in about 3 months (around 7/11/2023).  For follow-up on #1, 2, 3 above.    Please note that this dictation was created using voice recognition software. I have made every reasonable attempt to correct obvious errors, but I expect that there are errors of grammar and possibly content that I did not discover before finalizing the " note.      Dony Cosme PA-C 4/18/2023

## 2023-06-19 ENCOUNTER — OFFICE VISIT (OUTPATIENT)
Dept: MEDICAL GROUP | Facility: PHYSICIAN GROUP | Age: 48
End: 2023-06-19
Payer: COMMERCIAL

## 2023-06-19 ENCOUNTER — HOSPITAL ENCOUNTER (OUTPATIENT)
Dept: RADIOLOGY | Facility: MEDICAL CENTER | Age: 48
End: 2023-06-19
Attending: STUDENT IN AN ORGANIZED HEALTH CARE EDUCATION/TRAINING PROGRAM
Payer: COMMERCIAL

## 2023-06-19 VITALS
HEIGHT: 69 IN | OXYGEN SATURATION: 95 % | SYSTOLIC BLOOD PRESSURE: 134 MMHG | WEIGHT: 224 LBS | DIASTOLIC BLOOD PRESSURE: 80 MMHG | HEART RATE: 60 BPM | TEMPERATURE: 98.1 F | BODY MASS INDEX: 33.18 KG/M2

## 2023-06-19 DIAGNOSIS — M54.50 ACUTE RIGHT-SIDED LOW BACK PAIN WITHOUT SCIATICA: ICD-10-CM

## 2023-06-19 PROCEDURE — 3079F DIAST BP 80-89 MM HG: CPT | Performed by: STUDENT IN AN ORGANIZED HEALTH CARE EDUCATION/TRAINING PROGRAM

## 2023-06-19 PROCEDURE — 99213 OFFICE O/P EST LOW 20 MIN: CPT | Performed by: STUDENT IN AN ORGANIZED HEALTH CARE EDUCATION/TRAINING PROGRAM

## 2023-06-19 PROCEDURE — 72100 X-RAY EXAM L-S SPINE 2/3 VWS: CPT

## 2023-06-19 PROCEDURE — 3075F SYST BP GE 130 - 139MM HG: CPT | Performed by: STUDENT IN AN ORGANIZED HEALTH CARE EDUCATION/TRAINING PROGRAM

## 2023-06-19 RX ORDER — MELOXICAM 7.5 MG/1
7.5 TABLET ORAL DAILY
Qty: 30 TABLET | Refills: 1 | Status: SHIPPED | OUTPATIENT
Start: 2023-06-19 | End: 2023-07-28 | Stop reason: SDUPTHER

## 2023-06-19 ASSESSMENT — FIBROSIS 4 INDEX: FIB4 SCORE: 0.8

## 2023-06-19 NOTE — PROGRESS NOTES
"CC:  The encounter diagnosis was Acute right-sided low back pain without sciatica.    HISTORY OF THE PRESENT ILLNESS: Patient is a 47 y.o. male. This pleasant patient is here today to discuss:    1. Acute right-sided low back pain without sciatica  1 week ago the patient fell backwards while descending a set of metal stairs.  His back struck the edge of one of the stairs.  No head strike.  3 to 4 days later he began develop right-sided low back pain that is bad enough that it wakes him up after trying to sleep for 3 or 4 hours.  He reports no swelling or bruising.  He has been treating with Tylenol and ibuprofen with some degree of success.    Active Diagnosis:    Patient Active Problem List   Diagnosis    Obesity (BMI 30-39.9)    Impaired fasting glucose    Dyslipidemia    Hyperhidrosis    Other male erectile dysfunction    Hair loss    Acute right-sided low back pain without sciatica      Current Outpatient Medications Ordered in Epic   Medication Sig Dispense Refill    meloxicam (MOBIC) 7.5 MG Tab Take 1 Tablet by mouth every day. Do not use this medication with Motrin (ibuprofen) or Aleve (naproxen sodium).  You may use this medication with Tylenol. 30 Tablet 1    tadalafil (CIALIS) 10 MG tablet Take 1 Tablet by mouth as needed for Erectile Dysfunction. 30 Tablet 3    finasteride (PROPECIA) 1 MG tablet Take 1 Tablet by mouth every day. 30 Tablet 2    Sulfacetamide Sodium-Sulfur (SULFACETAMIDE SOD-SULFUR WASH) 9-4.5 % Liquid AAA 3-4 time per week as needed 454 g 1    SYRINGE-NEEDLE, DISP, 3 ML (B-D 3CC LUER-LUCAS SYR 29KK5-6/2) 18G X 1-1/2\" 3 ML Misc BD Luer-Lucas Syringe 3 mL 22 gauge x 1\"   TO BE USED FOR DEPO TESTOSTERONE INJECTIONS (Patient not taking: Reported on 6/19/2023)       No current [facility]-ordered facility-administered medications on file.     ROS:   See HPI.    Objective:     Exam: /80 (BP Location: Left arm, Patient Position: Sitting, BP Cuff Size: Large adult)   Pulse 60   Temp 36.7 °C (98.1 °F) " "(Temporal)   Ht 1.753 m (5' 9\")   Wt 102 kg (224 lb)   SpO2 95%  Body mass index is 33.08 kg/m².    General: Normal appearing. No distress.  neurologic: Grossly nonfocal.    Skin: Warm and dry.  No obvious lesions.  Musculoskeletal: No extremity cyanosis, clubbing, or edema.  Low back exam: No swelling, erythema, heat or deformity.  Patient demonstrates normal active ROM although this is with some pain.  Psych: Normal mood and affect.             Assessment & Plan:   47 y.o. male with the following -    Labs:   4/11/2023:  -CMP, WNL    1. Acute right-sided low back pain without sciatica  -Acute uncomplicated injury.  -Likely muscular strain/contusion.  Must rule out fracture.  - DX-LUMBAR SPINE-2 OR 3 VIEWS; Future  - meloxicam (MOBIC) 7.5 MG Tab; Take 1 Tablet by mouth every day. Do not use this medication with Motrin (ibuprofen) or Aleve (naproxen sodium).  You may use this medication with Tylenol.  Dispense: 30 Tablet; Refill: 1    Return if symptoms worsen or fail to improve.  Within the month.    Please note that this dictation was created using voice recognition software. I have made every reasonable attempt to correct obvious errors, but I expect that there are errors of grammar and possibly content that I did not discover before finalizing the note.      Dony Cosme PA-C 6/19/2023  "

## 2023-07-28 ENCOUNTER — OFFICE VISIT (OUTPATIENT)
Dept: MEDICAL GROUP | Facility: PHYSICIAN GROUP | Age: 48
End: 2023-07-28
Payer: COMMERCIAL

## 2023-07-28 VITALS
DIASTOLIC BLOOD PRESSURE: 72 MMHG | WEIGHT: 228.8 LBS | BODY MASS INDEX: 33.89 KG/M2 | HEART RATE: 72 BPM | HEIGHT: 69 IN | SYSTOLIC BLOOD PRESSURE: 118 MMHG | OXYGEN SATURATION: 95 % | TEMPERATURE: 98.5 F

## 2023-07-28 DIAGNOSIS — J45.20 MILD INTERMITTENT ASTHMA WITHOUT COMPLICATION: ICD-10-CM

## 2023-07-28 DIAGNOSIS — M54.50 ACUTE RIGHT-SIDED LOW BACK PAIN WITHOUT SCIATICA: ICD-10-CM

## 2023-07-28 PROCEDURE — 99214 OFFICE O/P EST MOD 30 MIN: CPT | Performed by: STUDENT IN AN ORGANIZED HEALTH CARE EDUCATION/TRAINING PROGRAM

## 2023-07-28 PROCEDURE — 3078F DIAST BP <80 MM HG: CPT | Performed by: STUDENT IN AN ORGANIZED HEALTH CARE EDUCATION/TRAINING PROGRAM

## 2023-07-28 PROCEDURE — 3074F SYST BP LT 130 MM HG: CPT | Performed by: STUDENT IN AN ORGANIZED HEALTH CARE EDUCATION/TRAINING PROGRAM

## 2023-07-28 RX ORDER — TADALAFIL 10 MG/1
1 TABLET ORAL PRN
COMMUNITY
End: 2023-07-28

## 2023-07-28 RX ORDER — TESTOSTERONE CYPIONATE 200 MG/ML
INJECTION, SOLUTION INTRAMUSCULAR
COMMUNITY
End: 2023-07-28

## 2023-07-28 RX ORDER — SODIUM SULFACETAMIDE, SULFUR 90; 40 MG/473.2ML; MG/473.2ML
LIQUID TOPICAL
COMMUNITY
End: 2023-07-28

## 2023-07-28 RX ORDER — MELOXICAM 7.5 MG/1
7.5 TABLET ORAL DAILY
Qty: 30 TABLET | Refills: 1 | Status: SHIPPED | OUTPATIENT
Start: 2023-07-28 | End: 2023-10-09 | Stop reason: SDUPTHER

## 2023-07-28 RX ORDER — ALBUTEROL SULFATE 90 UG/1
2 AEROSOL, METERED RESPIRATORY (INHALATION) EVERY 4 HOURS PRN
Qty: 2 EACH | Refills: 2 | Status: SHIPPED | OUTPATIENT
Start: 2023-07-28 | End: 2023-12-14 | Stop reason: SDUPTHER

## 2023-07-28 ASSESSMENT — FIBROSIS 4 INDEX: FIB4 SCORE: 0.82

## 2023-07-28 NOTE — PROGRESS NOTES
CC:  Diagnoses of Acute right-sided low back pain without sciatica and Mild intermittent asthma without complication were pertinent to this visit.    HISTORY OF THE PRESENT ILLNESS: Patient is a 48 y.o. male. This pleasant patient is here today to discuss:    1. Acute right-sided low back pain without sciatica  Good results with meloxicam 7.5 mg daily.  The sciatica has mostly resolved.  He continues to have some right-sided low back pain when bending forward although no pain when bending side to side or twisting.    2. 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  He does not report any wheezing.  He was previously treated with albuterol MDI which he used only on those days when he had symptoms and found this offered quick relief.    Active Diagnosis:    Patient Active Problem List   Diagnosis    Obesity (BMI 30-39.9)    Impaired fasting glucose    Dyslipidemia    Hyperhidrosis    Other male erectile dysfunction    Hair loss    Acute right-sided low back pain without sciatica    Mild intermittent asthma without complication      Current Outpatient Medications Ordered in Epic   Medication Sig Dispense Refill    meloxicam (MOBIC) 7.5 MG Tab Take 1 Tablet by mouth every day. Do not use this medication with Motrin (ibuprofen) or Aleve (naproxen sodium).  You may use this medication with Tylenol. 30 Tablet 1    albuterol 108 (90 Base) MCG/ACT Aero Soln inhalation aerosol Inhale 2 Puffs every four hours as needed for Shortness of Breath. 2 Each 2    tadalafil (CIALIS) 10 MG tablet Take 1 Tablet by mouth as needed for Erectile Dysfunction. 30 Tablet 3    finasteride (PROPECIA) 1 MG tablet Take 1 Tablet by mouth every day. 30 Tablet  "2    Sulfacetamide Sodium-Sulfur (SULFACETAMIDE SOD-SULFUR WASH) 9-4.5 % Liquid AAA 3-4 time per week as needed 454 g 1     No current [facility]-ordered facility-administered medications on file.     ROS:   See HPI.    Objective:     Exam: /72 (BP Location: Left arm, Patient Position: Sitting, BP Cuff Size: Adult)   Pulse 72   Temp 36.9 °C (98.5 °F) (Temporal)   Ht 1.753 m (5' 9\")   Wt 104 kg (228 lb 12.8 oz)   SpO2 95%  Body mass index is 33.79 kg/m².    General: Normal appearing. No distress.  Pulmonary: Clear to ausculation.  Normal effort. No rales, ronchi, or wheezing.  neurologic: Grossly nonfocal.    Skin: Warm and dry.  No obvious lesions.  Musculoskeletal: No extremity cyanosis, clubbing, or edema.  Psych: Normal mood and affect.             Assessment & Plan:   48 y.o. male with the following -    Labs:   4/11/2023:  -CMP, WNL    6/19/2023:  -DX lumbar spine reviewed.    1. Acute right-sided low back pain without sciatica  -Acute injury with continued recovery.  - meloxicam (MOBIC) 7.5 MG Tab; Take 1 Tablet by mouth every day. Do not use this medication with Motrin (ibuprofen) or Aleve (naproxen sodium).  You may use this medication with Tylenol.  Dispense: 30 Tablet; Refill: 1  - Referral to Physical Therapy    2. Mild intermittent asthma without complication  -Chronic with recent modest exacerbation.  -Restart albuterol 108 (90 Base) MCG/ACT Aero Soln inhalation aerosol; Inhale 2 Puffs every four hours as needed for Shortness of Breath.  Dispense: 2 Each; Refill: 2    Return in about 3 months (around 10/28/2023).    Please note that this dictation was created using voice recognition software. I have made every reasonable attempt to correct obvious errors, but I expect that there are errors of grammar and possibly content that I did not discover before finalizing the note.      Dony Cosme PA-C 7/28/2023  "

## 2023-08-18 ENCOUNTER — TELEPHONE (OUTPATIENT)
Dept: HEALTH INFORMATION MANAGEMENT | Facility: OTHER | Age: 48
End: 2023-08-18

## 2023-10-02 ENCOUNTER — APPOINTMENT (OUTPATIENT)
Dept: PHYSICAL THERAPY | Facility: REHABILITATION | Age: 48
End: 2023-10-02
Attending: STUDENT IN AN ORGANIZED HEALTH CARE EDUCATION/TRAINING PROGRAM

## 2023-10-05 ENCOUNTER — APPOINTMENT (OUTPATIENT)
Dept: PHYSICAL THERAPY | Facility: REHABILITATION | Age: 48
End: 2023-10-05
Attending: BEHAVIOR ANALYST

## 2023-10-09 ENCOUNTER — APPOINTMENT (OUTPATIENT)
Dept: PHYSICAL THERAPY | Facility: REHABILITATION | Age: 48
End: 2023-10-09
Attending: STUDENT IN AN ORGANIZED HEALTH CARE EDUCATION/TRAINING PROGRAM

## 2023-10-09 DIAGNOSIS — M54.50 ACUTE RIGHT-SIDED LOW BACK PAIN WITHOUT SCIATICA: ICD-10-CM

## 2023-10-09 RX ORDER — MELOXICAM 7.5 MG/1
7.5 TABLET ORAL DAILY
Qty: 30 TABLET | Refills: 0 | Status: SHIPPED | OUTPATIENT
Start: 2023-10-09

## 2023-12-14 DIAGNOSIS — J45.20 MILD INTERMITTENT ASTHMA WITHOUT COMPLICATION: ICD-10-CM

## 2023-12-15 RX ORDER — ALBUTEROL SULFATE 90 UG/1
2 AEROSOL, METERED RESPIRATORY (INHALATION) EVERY 4 HOURS PRN
Qty: 1 EACH | Refills: 0 | Status: SHIPPED | OUTPATIENT
Start: 2023-12-15